# Patient Record
Sex: FEMALE | Race: WHITE
[De-identification: names, ages, dates, MRNs, and addresses within clinical notes are randomized per-mention and may not be internally consistent; named-entity substitution may affect disease eponyms.]

---

## 2018-06-27 ENCOUNTER — HOSPITAL ENCOUNTER (INPATIENT)
Dept: HOSPITAL 36 - ER | Age: 83
LOS: 9 days | Discharge: NURSING, REHAB OR LONG-TERM CARE | DRG: 871 | End: 2018-07-06
Attending: FAMILY MEDICINE | Admitting: FAMILY MEDICINE
Payer: MEDICARE

## 2018-06-27 DIAGNOSIS — K52.9: ICD-10-CM

## 2018-06-27 DIAGNOSIS — I25.119: ICD-10-CM

## 2018-06-27 DIAGNOSIS — E11.22: ICD-10-CM

## 2018-06-27 DIAGNOSIS — J44.0: ICD-10-CM

## 2018-06-27 DIAGNOSIS — E78.5: ICD-10-CM

## 2018-06-27 DIAGNOSIS — I21.4: ICD-10-CM

## 2018-06-27 DIAGNOSIS — E11.40: ICD-10-CM

## 2018-06-27 DIAGNOSIS — J44.1: ICD-10-CM

## 2018-06-27 DIAGNOSIS — I25.10: ICD-10-CM

## 2018-06-27 DIAGNOSIS — K44.9: ICD-10-CM

## 2018-06-27 DIAGNOSIS — M81.0: ICD-10-CM

## 2018-06-27 DIAGNOSIS — J18.9: ICD-10-CM

## 2018-06-27 DIAGNOSIS — E03.9: ICD-10-CM

## 2018-06-27 DIAGNOSIS — M35.00: ICD-10-CM

## 2018-06-27 DIAGNOSIS — Z66: ICD-10-CM

## 2018-06-27 DIAGNOSIS — L89.152: ICD-10-CM

## 2018-06-27 DIAGNOSIS — K80.10: ICD-10-CM

## 2018-06-27 DIAGNOSIS — Z88.1: ICD-10-CM

## 2018-06-27 DIAGNOSIS — Z87.11: ICD-10-CM

## 2018-06-27 DIAGNOSIS — I12.0: ICD-10-CM

## 2018-06-27 DIAGNOSIS — L30.9: ICD-10-CM

## 2018-06-27 DIAGNOSIS — N39.0: ICD-10-CM

## 2018-06-27 DIAGNOSIS — K55.9: ICD-10-CM

## 2018-06-27 DIAGNOSIS — A41.9: Primary | ICD-10-CM

## 2018-06-27 DIAGNOSIS — J96.00: ICD-10-CM

## 2018-06-27 DIAGNOSIS — Z88.0: ICD-10-CM

## 2018-06-27 DIAGNOSIS — F03.90: ICD-10-CM

## 2018-06-27 DIAGNOSIS — K57.33: ICD-10-CM

## 2018-06-27 DIAGNOSIS — K27.9: ICD-10-CM

## 2018-06-27 DIAGNOSIS — I25.9: ICD-10-CM

## 2018-06-27 DIAGNOSIS — Z83.3: ICD-10-CM

## 2018-06-27 DIAGNOSIS — Z82.49: ICD-10-CM

## 2018-06-27 DIAGNOSIS — Z88.8: ICD-10-CM

## 2018-06-27 DIAGNOSIS — K57.32: ICD-10-CM

## 2018-06-27 DIAGNOSIS — K21.9: ICD-10-CM

## 2018-06-27 DIAGNOSIS — N18.6: ICD-10-CM

## 2018-06-27 LAB
ALBUMIN SERPL-MCNC: 3.2 GM/DL (ref 3.7–5.3)
ALBUMIN/GLOB SERPL: 0.8 {RATIO} (ref 1–1.8)
ALP SERPL-CCNC: 107 U/L (ref 34–104)
ALT SERPL-CCNC: 5 U/L (ref 7–52)
AMYLASE SERPL-CCNC: 12 U/L (ref 29–103)
ANION GAP SERPL CALC-SCNC: 17 MMOL/L (ref 7–16)
ARTERIAL PATENCY WRIST A: (no result)
AST SERPL-CCNC: 20 U/L (ref 13–39)
BASOPHILS # BLD AUTO: 0 TH/CUMM (ref 0–0.2)
BASOPHILS NFR BLD AUTO: 0 % (ref 0–2)
BILIRUB SERPL-MCNC: 0.5 MG/DL (ref 0.3–1)
BUN SERPL-MCNC: 15 MG/DL (ref 7–25)
CALCIUM SERPL-MCNC: 9.6 MG/DL (ref 8.6–10.3)
CHLORIDE SERPL-SCNC: 99 MEQ/L (ref 98–107)
CHOLEST SERPL-MCNC: 130 MG/DL (ref ?–200)
CK SERPL-CCNC: 21 U/L (ref 30–223)
CO2 SERPL-SCNC: 23 MEQ/L (ref 21–31)
CREAT SERPL-MCNC: 1.1 MG/DL (ref 0.6–1.2)
EOSINOPHIL # BLD AUTO: 0 TH/CMM (ref 0.1–0.4)
EOSINOPHIL NFR BLD AUTO: 0.3 % (ref 0–5)
ERYTHROCYTE [DISTWIDTH] IN BLOOD BY AUTOMATED COUNT: 14.1 % (ref 11.5–20)
GLOBULIN SER-MCNC: 3.8 GM/DL
GLUCOSE SERPL-MCNC: 190 MG/DL (ref 70–105)
HBA1C MFR BLD: 5.1 % (ref 4–6)
HCT VFR BLD CALC: 46.5 % (ref 41–60)
HDLC SERPL-MCNC: 24 MG/DL (ref 23–92)
HGB BLD-MCNC: 15.1 GM/DL (ref 12–16)
HGB BLD-MCNC: 16 G/DL (ref 4–35)
INR PPP: 1.06 (ref 0.5–1.4)
LIPASE SERPL-CCNC: 6 U/L (ref 11–82)
LYMPHOCYTE AB SER FC-ACNC: 1.3 TH/CMM (ref 1.5–3)
LYMPHOCYTES NFR BLD AUTO: 15.2 % (ref 20–50)
MCH RBC QN AUTO: 30.3 PG (ref 27–31)
MCHC RBC AUTO-ENTMCNC: 32.6 PG (ref 28–36)
MCV RBC AUTO: 93.1 FL (ref 81–100)
MONOCYTES # BLD AUTO: 0.5 TH/CMM (ref 0.3–1)
MONOCYTES NFR BLD AUTO: 5.7 % (ref 2–10)
NEUTROPHILS # BLD: 7 TH/CMM (ref 1.8–8)
NEUTROPHILS NFR BLD AUTO: 78.8 % (ref 40–80)
PCO2 BLDA: 41 MMHG (ref 35–45)
PLATELET # BLD: 427 TH/CMM (ref 150–400)
PMV BLD AUTO: 6.8 FL
PO2 BLDA: 44 MMHG (ref 80–100)
POTASSIUM SERPL-SCNC: 4 MEQ/L (ref 3.5–5.1)
PROTHROMBIN TIME: 11 SECONDS (ref 9.5–11.5)
RBC # BLD AUTO: 5 MIL/CMM (ref 3.8–5.2)
SAO2 % BLDA: 79 % (ref 92–100)
SODIUM SERPL-SCNC: 135 MEQ/L (ref 136–145)
TRIGL SERPL-MCNC: 143 MG/DL (ref ?–150)
WBC # BLD AUTO: 8.8 TH/CMM (ref 4.8–10.8)

## 2018-06-27 PROCEDURE — C9113 INJ PANTOPRAZOLE SODIUM, VIA: HCPCS

## 2018-06-27 PROCEDURE — Z7610: HCPCS

## 2018-06-27 PROCEDURE — X6598: HCPCS

## 2018-06-27 PROCEDURE — X3401: HCPCS

## 2018-06-27 PROCEDURE — A9537 TC99M MEBROFENIN: HCPCS

## 2018-06-27 RX ADMIN — HYDROCODONE BITARTRATE AND ACETAMINOPHEN SCH TAB: 5; 325 TABLET ORAL at 23:52

## 2018-06-27 NOTE — ED PHYSICIAN CHART
ED Chief Complaint/HPI





- Patient Information


Date Seen:: 06/27/18


Time Seen:: 18:00


Chief Complaint:: Abdominal Pain


History of Present Illness:: 





onset x one day of diffuse, intermittent, crampy abdominal pain, N/V/D x 10; no 

report of trauma, H/As, S/T, neck pain, C/P, SOB, A/C, fever, chills, bleeding, 

or urinary s/s


Historian:: Patient, EMS


Review:: Nurse's Note Reviewed, Old Chart Reviewed, EMS run form Reviewed





ED Review of Systems





- Review of Systems


General/Constitutional: No fever, No chills, No weight loss, No weakness, No 

diaphoresis, No edema, No loss of appetite


Skin: No skin lesions, No rash, No bruising


Head: No headache, No light-headedness


Eyes: No loss of vision, No pain, No diplopia


ENT: No earache, No nasal drainage, No sore throat, No tinnitus


Neck: No neck pain, No swelling, No thyromegaly, No stiffness, No mass noted


Cardio Vascular: No chest pain, No palpitations, No PND, No orthopnea, No edema


Pulmonary: No SOB, No cough, No sputum, No wheezing


GI: Nausea, Vomiting, Diarrhea, Pain, No melena, No hematochezia, No 

constipation, No hematemesis


G/U: No dysuria, No frequency, No hematuria, No nacturia


Ob/Gyn: No vaginal discharge, No abnormal vaginal bleed, No contraction


Musculoskeletal: No bone or joint pain, No back pain, No muscle pain


Endocrine: No polyuria, No polydipsia


Psychiatric: No prior psych history, No depression, No anxiety, No suicidal 

ideation, No homicidal ideation, No auditory hallucination, No visual 

hallucination


Hematopoietic: No bruising, No lymphadenopathy


Allergic/Immuno: No urticaria, No angioedema


Neurological: No syncope, No focal symptoms, No weakness, No paresthesia, No 

headache, No seizure, No dizziness, Confusion, No vertigo





ED Past Medical History





- Past Medical History


Obtainable: Yes


Past Medical History: HTN, CAD, Asthma/COPD, PUD/GERD, ESRD, Dementia


Family History: Diabetes Melitus, HTN


Social History: Non Smoker, No Alcohol, No Drug Use, , Care Facility


Surgical History: None


Psychiatricy History: Dementia


Medication: Reviewed





Family Medical History





- Family Member


  ** Mother


History Unknown: Yes





ED Physical Exam





- Physical Examination


General/Constitutional: Awake, Well-developed, well-nourished, Alert, No 

distress, GCS 15, Non-toxic appearing, Ambulatory


Head: Atraumatic


Eyes: Lids, conjuctiva normal, PERRL, EOMI


Skin: Nl inspection, No rash, No skin lesions, No ecchymosis, Well hydrated, No 

lymphadenopathy


ENMT: External ears, nose nl, TM canals nl, Nasal exam nl, Lips, teeth, gums nl

, Oropharynx nl, Tonsils nl


Neck: Nontender, Full ROM w/o pain, No JVD, No nuchal rigidity, No bruit, No 

mass, No stridor


Respiratory: Nl effort/Exclusion


Other Respiratory comments:: 





Lungs: + Expiratory Wheezes


Cardio Vascular: RRR, No murmur, gallop, rubs, NL S1 S2, Carotid/Femoral/Distal 

pulses equal bilaterally


GI: No tenderness/rebounding/guarding, No organomegaly, No hernia, Normal BS's, 

Nondistended, No mass/bruits, No McBurney tenderness, Rectum exam nl


: No CVA tenderness


Extremities: No tenderness or effusion, Full ROM, normal strength in all 

extremities, No edema, Normal digits & nails


Neuro/Psych: Alert/oriented, DTR's symmetric, Normal sensory exam, Normal motor 

strength, Judgement/insight normal, Mood normal, Normal gait, No focal deficits


Misc: Normal back, No paraspinal tenderness





ED Labs/Radiology/EKG Results





- Lab Results


Comments:: 





Troponin: 0.20





- Radiology Results


Comments:: 





+ Infiltrate





- EKG Interpretations


EKG Time:: 18:24


Rate & Rhythm: 118; ST; PAT


Comments:: 





non-specific st-t changes; T- Wave Inversions





ED Septic Shock





- .


Is Septic Shock (SBP<90, OR Lactate>4 mmol\L) present?: No





ED Reassessment (Disposition)





- Reassessment


Reassessment Condition:: Improved





- Diagnosis


Diagnosis:: 





Abdominal Pain; N/V/D; AGE; GI Bleed; AGE; PNA; COPD with Exacerbation; Sepsis; 

Myocardial Ischemia





- Aftercare/Follow up Instructions


Aftercare/Follow-Up Instructions:: Counseled pt regarding lab results/diagnosis 

& need follow up, Counseled pt & family regarding lab results/diagnosis & need 

follow up





- Patient Disposition


Discharge/Transfer:: Acute Care w/in this hosp


Accepting Physician:: Dr. Germain


Time Called:: 2000


Time Responded:: 20:00


Admitted to:: Telemetry


Spoke to:: Dr. Germain


Admitting Medical Physician:: Dr. Germain


Condition at Disposition:: Stable, Improved

## 2018-06-28 LAB
APPEARANCE UR: (no result)
BACTERIA #/AREA URNS HPF: (no result) /HPF
BILIRUB UR-MCNC: NEGATIVE MG/DL
COLOR UR: YELLOW
EPI CELLS URNS QL MICRO: (no result) /LPF
GLUCOSE UR STRIP-MCNC: NEGATIVE MG/DL
KETONES UR STRIP-MCNC: 15 MG/DL
LEUKOCYTE ESTERASE UR-ACNC: (no result)
MICRO URNS: YES
NITRITE UR QL STRIP: POSITIVE
PH UR STRIP: 5.5 [PH] (ref 4.6–8)
PROT UR STRIP-MCNC: (no result) MG/DL
RBC # UR STRIP: NEGATIVE /UL
RBC #/AREA URNS HPF: (no result) /HPF (ref 0–5)
SP GR UR STRIP: 1.02 (ref 1–1.03)
URINALYSIS COMPLETE PNL UR: (no result)
UROBILINOGEN UR STRIP-ACNC: 1 E.U./DL (ref 0.2–1)
WBC #/AREA URNS HPF: (no result) /HPF (ref 0–5)

## 2018-06-28 RX ADMIN — DILTIAZEM HYDROCHLORIDE SCH: 30 TABLET, FILM COATED ORAL at 17:10

## 2018-06-28 RX ADMIN — MORPHINE SULFATE PRN MG: 2 INJECTION, SOLUTION INTRAMUSCULAR; INTRAVENOUS at 08:58

## 2018-06-28 RX ADMIN — HYDROCODONE BITARTRATE AND ACETAMINOPHEN SCH: 5; 325 TABLET ORAL at 21:00

## 2018-06-28 RX ADMIN — ALBUTEROL SULFATE PRN MG: 2.5 SOLUTION RESPIRATORY (INHALATION) at 07:14

## 2018-06-28 RX ADMIN — MORPHINE SULFATE PRN MG: 2 INJECTION, SOLUTION INTRAMUSCULAR; INTRAVENOUS at 17:54

## 2018-06-28 RX ADMIN — MORPHINE SULFATE PRN MG: 2 INJECTION, SOLUTION INTRAMUSCULAR; INTRAVENOUS at 01:36

## 2018-06-28 RX ADMIN — HYDROCODONE BITARTRATE AND ACETAMINOPHEN SCH: 5; 325 TABLET ORAL at 04:01

## 2018-06-28 RX ADMIN — DILTIAZEM HYDROCHLORIDE SCH: 30 TABLET, FILM COATED ORAL at 08:59

## 2018-06-28 RX ADMIN — HYDROCODONE BITARTRATE AND ACETAMINOPHEN SCH: 5; 325 TABLET ORAL at 09:02

## 2018-06-28 RX ADMIN — MORPHINE SULFATE PRN MG: 2 INJECTION, SOLUTION INTRAMUSCULAR; INTRAVENOUS at 23:43

## 2018-06-28 RX ADMIN — MORPHINE SULFATE PRN MG: 2 INJECTION, SOLUTION INTRAMUSCULAR; INTRAVENOUS at 04:51

## 2018-06-28 RX ADMIN — MORPHINE SULFATE PRN MG: 2 INJECTION, SOLUTION INTRAMUSCULAR; INTRAVENOUS at 15:24

## 2018-06-28 RX ADMIN — LEVOTHYROXINE SODIUM SCH: 100 TABLET ORAL at 06:57

## 2018-06-28 RX ADMIN — MORPHINE SULFATE PRN MG: 2 INJECTION, SOLUTION INTRAMUSCULAR; INTRAVENOUS at 20:08

## 2018-06-28 RX ADMIN — MORPHINE SULFATE PRN MG: 2 INJECTION, SOLUTION INTRAMUSCULAR; INTRAVENOUS at 12:53

## 2018-06-28 RX ADMIN — PANTOPRAZOLE SODIUM SCH: 40 TABLET, DELAYED RELEASE ORAL at 06:58

## 2018-06-28 RX ADMIN — LEVOFLOXACIN SCH: 5 INJECTION INTRAVENOUS at 02:11

## 2018-06-28 RX ADMIN — HYDROCODONE BITARTRATE AND ACETAMINOPHEN SCH: 5; 325 TABLET ORAL at 15:11

## 2018-06-28 NOTE — DIAGNOSTIC IMAGING REPORT
CHEST X-RAY: AP view



INDICATION: pain



COMPARISON: None



FINDINGS: Chronic lung changes are seen with increased left basal lung

markings.  No focal consolidation or effusions.  Mild cardiomegaly is

noted with atherosclerotic vascular disease.  Degenerative changes of

the spine are noted.



IMPRESSION:



Chronic lung changes with increased left basal lung markings probably

chronic.  Faint infiltrate is less likely.



Mild cardiomegaly with atherosclerosis.

## 2018-06-28 NOTE — HISTORY AND PHYSICAL
History of Present Illness





- HPI


Chief Complaint: 





abdominal pain 


HPI: 





87 year old female who is from a care facility admitted to telemetry due to 

nausea/vomiting/diarrhea and abdominal pain x 10 days, no fevers were reported 

at the care facility. 


Vital Signs: 





 Last Vital Signs











Temp  98.6 F   06/28/18 20:03


 


Pulse  93   06/28/18 20:03


 


Resp  19   06/28/18 20:03


 


BP  141/80   06/28/18 20:03


 


Pulse Ox  94   06/28/18 20:03














Past Medical History


Other History: 





cad


copd


pud


gerd


dementia


asthma


hypotension








Family Medical History





- Family Member


  ** Mother


History Unknown: Yes


Other Medical History: Unable to remember, can't give information at this time.





Social History


Smoke: No


Alcohol: None


Drugs: None


Lives: Nursing Home





- Medications


Home Medications: 


Home Medication











 Medication  Instructions  Recorded  Type


 


Acetaminophen [Pain Reliever] 650 mg PO Q6H PRN 06/27/18 History


 


Albuterol Sulfate 1 unit INH Q2H PRN 06/27/18 History


 


Albuterol Sulfate 1 unit INH Q4H 06/27/18 History


 


Ascorbic Acid 1 tab PO DAILY 06/27/18 History


 


Clopidogrel [Plavix] 1 tab PO DAILY 06/27/18 History


 


Cranberry Fruit Extract [Cranberry] 1 tab PO DAILY 06/27/18 History


 


Diltiazem [Cardizem*] 1 tab PO BID 06/27/18 History


 


Docusate Sodium [Colace] 1 tab PO BID 06/27/18 History


 


Donepezil Hcl [Aricept] 1 tbs PO HS 06/27/18 History


 


Gabapentin [Neurontin] 1 tab PO Q6H 06/27/18 History


 


Hydrocodone/Acetaminophen [Norco 1 tab PO Q6H 06/27/18 History





325 mg-5 mg*]   


 


Levothyroxine Sodium [Synthroid] 1 tab PO DAILY 06/27/18 History


 


Montelukast [Singulair] 1 tab PO DAILY 06/27/18 History


 


Pantoprazole Sodium [Protonix] 40 mg PO DAILY 06/27/18 History


 


Prednisone [Bipin] 1 tab PO DAILY 06/27/18 History














- Allergies


Allergies/Adverse Reactions: 


 Allergies











Allergy/AdvReac Type Severity Reaction Status Date / Time


 


amoxicillin Allergy   Verified 06/27/18 18:23


 


clindamycin Allergy   Verified 06/27/18 18:24


 


naproxen Allergy   Verified 06/27/18 18:24


 


Penicillins [PCN] Allergy   Verified 06/27/18 18:25


 


raisin Allergy   Uncoded 06/27/18 18:27


 


teflaro Allergy   Uncoded 06/27/18 18:26


 


yogurt Allergy   Uncoded 06/27/18 18:26














Review of Systems





- Review of Systems


Constitutional: Report: Weakness


Eyes: Report: No Significant


Respiratory: Report: No Significant


Cardiovascular: Report: No Significant


Neurological: Report: Weakness





Physical Exam





- Physical Exam


HEENT: Report: Ears Nose Throat within normal limits


Neck: Report: Within normal limits


Cardiovascular Systems: Report: +s1/s2 noted, Regular, Rate and Rhythm


Respiratory: Report: Breath Sounds are within normal limits


Extremities: Report: Non-tender to palpation.


Skin: Report: Color of skin is within normal limits


Neuro/Psych: Report: Mood affect is within normal limits





- Lab Results


All Lab Results last 24 hours: 





 Laboratory Results - last 24 hr











  06/28/18





  05:05


 


Urine Source  CATH


 


Urine Color  YELLOW


 


Urine Clarity  TURBID H


 


Urine pH  5.5


 


Ur Specific Gravity  1.020


 


Urine Protein  TRACE


 


Urine Glucose (UA)  NEGATIVE


 


Urine Ketones  15 H


 


Urine Blood  NEGATIVE


 


Urine Nitrate  POSITIVE H


 


Urine Bilirubin  NEGATIVE


 


Urine Urobilinogen  1.0


 


Ur Leukocyte Esterase  MODERATE H


 


Urine RBC  0-2


 


Urine WBC   H


 


Ur Epithelial Cells  MODERATE


 


Urine Bacteria  4+ H














- Assessment


Assessment: 





abdominal pain


gastroenteritis


cad


copd 


pud


gerd


dementia








- Plan


Plan: 





iv vanco as per id


cbc/bmp in am


continue the rest of the orders

## 2018-06-28 NOTE — CONSULTATION
DATE OF CONSULTATION:  06/27/2018



INFECTIOUS DISEASE CONSULTATION



REFERRING PHYSICIAN:  Dr. Germain.



REASON FOR CONSULTATION:  Abdominal pain.



HISTORY OF PRESENT ILLNESS:  The patient is an 87-year-old female with a past

medical history of hypertension, coronary artery disease, asthma, COPD, peptic

ulcer, GERD, brought from the nursing facility for diffuse, crampy abdominal

pain associated with nausea, vomiting and diarrhea for the last 10 days.  The

patient has no fever, no chills.  On initial evaluation, the patient's

temperature was 98.9 degrees Fahrenheit and WBC count was 8800.  Zosyn and

vancomycin were started and ID consult was called for further antibiotic

management.



PAST MEDICAL HISTORY:  Includes coronary artery disease, hypertension, asthma,

COPD, peptic ulcer, GERD and dementia.



FAMILY HISTORY:  Diabetes mellitus type 2, hypertension.



SOCIAL HISTORY:  The patient lives at a nursing facility.  No history of

smoking, alcohol or drug use.  She is .



PAST SURGICAL HISTORY:  None.



PSYCHIATRIC HISTORY:  Dementia.



REVIEW OF SYSTEMS:

GENERAL:  The patient has no fever, no chills, no generalized weakness, no loss

of appetite.

HEENT:  No diplopia, no photophobia, no sore throat.

RESPIRATORY:  No cough, no short of breath.

CARDIOVASCULAR:  No chest pain or palpitations.

GASTROINTESTINAL:  The patient has diffuse, crampy abdominal pain.  The patient

has nausea and vomiting with diarrhea.  No constipation.

GENITOURINARY:  No dysuria.

NEUROLOGIC:  No headache, no dizziness.  No focal weakness.



PHYSICAL EXAMINATION:

VITAL SIGNS:  Current vital signs show temperature is 99.8 degrees Fahrenheit,

pulse 102, respirations 18, blood pressure 102/56.

GENERAL:  The patient is comfortable, lying in the bed, not in acute distress,

lean and thin female.

HEENT:  Head is normocephalic, atraumatic.  Oral cavity moist, pink tongue. 

Eyes:  No pallor, no icterus, PERRLA, EOMI.

NECK:  Supple.  No JVD, no carotid bruit.  Trachea in midline.

CHEST:  Bilateral breath sounds.  No crackles or wheezing.

HEART:  S1, S2 within normal limits.  Regular rhythm.  No murmur, no gallop.

ABDOMEN:  The patient is nondistended, but the patient has generalized

tenderness.  Bowel sounds present.

EXTREMITIES:  No cyanosis, no clubbing, no edema.

NEUROLOGICAL:  Alert, awake, oriented x 3.  Communicates well.  Speech is clear.



LABORATORY DATA:  Current labs show WBC count 8800, hemoglobin 15.1, hematocrit

46.5, platelets are 427,000, neutrophils 78.8%.  Sodium is 135, potassium 4,

chloride 99, bicarbonate is 23, BUN is 17, creatinine 1.1, and glucose is 190. 

LFTs reviewed.  CPK is 21.  Troponin 0.2 and .  Chest x-ray is pending.



IMPRESSION:

1.  Colicky abdominal pain, most likely the patient had gastroenteritis, rule

out other etiologies.

2.  Coronary artery disease.

3.  Chronic obstructive pulmonary disease.

4.  Peptic ulcer disease.



RECOMMENDATIONS:  We will continue Zosyn and discontinue vancomycin.  We will do

a CT scan of the abdomen and pelvis without contrast.



Thank you, Dr. Germain, for involving me in taking care of this patient.





DD: 06/28/2018 00:42

DT: 06/28/2018 02:38

JOB# [id number]  [id number]

## 2018-06-28 NOTE — CONSULTATION
DATE OF CONSULTATION:  06/28/2018



REQUESTING PHYSICIAN:  Shanta Germain.



REASON FOR CONSULTATION:  Abdominal pain.



HISTORY OF PRESENT ILLNESS:  An 87-year-old female with dementia, diabetes

mellitus, hypertension, asthma, COPD, peptic ulcer disease, GERD, admitted for a

few day history of vague abdominal pain with nausea, vomiting and diarrhea. 

Here, she was noted to have a normal white blood cell count and hemoglobin, but

CT showed diverticulitis.  The patient is a poor historian.  It is unclear

whether she has had diverticulitis previously.  She is also unaware of any

previous surgeries or past colonoscopies.  She is, however, a poor historian.



PAST MEDICAL HISTORY:  As above.



MEDICATIONS:  Here are Tylenol, Norco, albuterol, vitamin C, Plavix, diltiazem,

docusate, Aricept, Neurontin, Levaquin, Synthroid, Singulair, morphine, Zofran,

Protonix, Zosyn, prednisone 5 mg daily and IV fluids.



ALLERGIES:  AMOXICILLIN, CLINDAMYCIN, NAPROSYN, PENICILLIN, RAISINS, TEFLARO,

and YOGURT.



SOCIAL HISTORY:  No recent tobacco, alcohol, or drugs.



FAMILY HISTORY:  Noncontributory.



REVIEW OF SYSTEMS:  A comprehensive 12-point review of systems conducted and is

only positive for the signs and symptoms present in the history of present

illness.



PHYSICAL EXAMINATION:

VITAL SIGNS:  Temperature of 98.0, blood pressure is 114/58, pulse is 92,

respirations 76, O2 sat is 94%.

GENERAL:  The patient is well-developed, well-nourished female in no acute

distress.

HEENT:  Sclerae nonicteric.  Oropharynx clear.

CARDIOVASCULAR:  Regular rate and rhythm.

LUNGS:  Clear to auscultation bilaterally.

ABDOMEN:  Soft, mild left lower and right lower quadrant tenderness to palpation

with mild distention, no rebound or guarding is appreciated.

EXTREMITIES:  No clubbing, cyanosis or edema.

RECTAL:  Deferred.



LABORATORY DATA AND IMAGING:  Complete blood count is normal except for platelet

count mildly elevated to 427.  INR is normal.  Creatinine is normal.  Liver

enzymes are normal.  Urinalysis shows positive nitrites and leukocyte esterase,

 wbc's and 4+ bacteria.



IMPRESSION:

1.  Abdominal pain secondary to acute sigmoid diverticulitis with CT positive. 

There is also question of air around the bladder.  This could be from a

colovesical fistula versus focal abscess.  The patient also likely has a UTI

based on urinalysis.

2.  Elevated troponin, rule out myocardial infarction.  The patient does have a

history of coronary artery disease.

3.  Gallstones, likely asymptomatic.

4.  Hiatal hernia per CT.

5.  History of diabetes mellitus, hypertension, asthma, COPD, peptic ulcer

disease, GERD and dementia.



RECOMMENDATIONS:

1.  N.p.o. with IV fluids and pain control measures.

2.  Antibiotics.

3.  Monitor labs.

4.  Conservative non-endoscopic management for now.



Thank you, Dr. Shanta Germain for involving us in the care of your patient. 

If you have any further questions, please call us.





DD: 06/28/2018 17:17

DT: 06/28/2018 23:41

JOB# [id number]  [id number]

## 2018-06-28 NOTE — DIAGNOSTIC IMAGING REPORT
CT abdomen and pelvis without intravenous contrast



Indication: Abdominal pain



Comparison: None,



Technique: Axial images were obtained from the lung bases to the

bilateral proximal femurs without IV contrast.  Coronal reconstructions

were made.  total DLP: [id number],  [id number]



FINDINGS:



Hypoventilatory atelectatic changes of the lung bases are noted. 

Bibasal consolidative changes are noted.  Assessment of solid organs is

limited that lack of IV contrast.  Cardiomegaly is noted.  There is a

moderate to large sized hiatal hernia.  No focal hepatic lesions. 

Distended gallbladder is noted with gallstones.  No focal splenic

lesions.  Splenic artery calcifications are noted with calcification of

the splenic hilum also noted.  Severe pancreatic atrophy is noted with

no discrete focal lesions.  No focal pancreatic lesions.  No evidence of

hydronephrosis or nephrolithiasis.  



Diverticulosis is noted with diffuse inflammatory changes and bowel wall

thickening involving the descending and sigmoid colon with small

surrounding free fluid suggestive of diverticulitis and possible

colitis.  Inflammatory changes extend to the pelvis.  The urinary

bladder wall thickening is also noted.  There is suggestion of the Tiny

pocket of gas is seen along left side of the urinary bladder.  There

appear to be fibroid calcification of the uterus measuring 1.8 cm. 

There is a cystic right adnexal mass is in 5.0 x 4.5 cm.



No evidence of free abdominal air or abscess.  Diffuse atherosclerotic

vascular disease is noted.  An IVC filter is seen at the L2/L3 level. 

Extensive multilevel degenerative changes of spine are noted advanced

compression fractures of T11 and L3.  There is also evidence of previous

posterior fusion of L2 and L4.  Osteopenia is noted.



IMPRESSION:



Diverticulosis with bowel wall thickening and inflammatory change

extending from the descending colon to the sigmoid colon with small

amount of adjacent free fluid.  Findings suggest diverticulitis.  There

may be colitis within the descending colon.  Clinical correlation follow

up recommended.  No evidence of free air or abscess formation.



Diverticular inflammatory changes extend to the pelvis.  Urinary bladder

wall thickening is also noted.  There is suggestion of a tiny pocket of

gas along the superior left side of the urinary bladder.  Recent

instrumentation or infectious/inflammatory process should be considered.

 Occult fistulous connection from the sigmoid colon to the urinary

bladder is less likely but cannot be excluded.



Right adnexal 5.0 x 4.5 cm cystic lesion.  Recommend further assessment

with ultrasound.



Calcified uterine fibroid.



Distended gallbladder with gallstone.  Recommend further assessment with

ultrasound.  



Moderate to large size hiatal hernia



Bibasilar passive atelectatic and consolidative changes.



IVC filter at the L2/L3 level.



Advanced compression fractures of T11 and L3.  There is also evidence of

posterior fusion of L2 and L4.



Osteopenia.  



Degenerative changes.  



Atherosclerotic vascular disease.

## 2018-06-29 RX ADMIN — PANTOPRAZOLE SODIUM SCH: 40 TABLET, DELAYED RELEASE ORAL at 08:30

## 2018-06-29 RX ADMIN — MORPHINE SULFATE PRN MG: 2 INJECTION, SOLUTION INTRAMUSCULAR; INTRAVENOUS at 14:02

## 2018-06-29 RX ADMIN — HYDROMORPHONE HYDROCHLORIDE PRN MG: 1 INJECTION, SOLUTION INTRAMUSCULAR; INTRAVENOUS; SUBCUTANEOUS at 17:05

## 2018-06-29 RX ADMIN — LEVOFLOXACIN SCH MLS/HR: 5 INJECTION INTRAVENOUS at 01:00

## 2018-06-29 RX ADMIN — HYDROMORPHONE HYDROCHLORIDE PRN MG: 2 INJECTION INTRAMUSCULAR; INTRAVENOUS; SUBCUTANEOUS at 22:50

## 2018-06-29 RX ADMIN — DILTIAZEM HYDROCHLORIDE SCH: 30 TABLET, FILM COATED ORAL at 08:30

## 2018-06-29 RX ADMIN — HYDROCODONE BITARTRATE AND ACETAMINOPHEN SCH: 5; 325 TABLET ORAL at 17:08

## 2018-06-29 RX ADMIN — HYDROCODONE BITARTRATE AND ACETAMINOPHEN SCH: 5; 325 TABLET ORAL at 21:46

## 2018-06-29 RX ADMIN — MORPHINE SULFATE PRN MG: 2 INJECTION, SOLUTION INTRAMUSCULAR; INTRAVENOUS at 08:10

## 2018-06-29 RX ADMIN — MORPHINE SULFATE PRN MG: 2 INJECTION, SOLUTION INTRAMUSCULAR; INTRAVENOUS at 06:29

## 2018-06-29 RX ADMIN — LEVOTHYROXINE SODIUM SCH: 100 TABLET ORAL at 08:30

## 2018-06-29 RX ADMIN — MORPHINE SULFATE PRN MG: 2 INJECTION, SOLUTION INTRAMUSCULAR; INTRAVENOUS at 03:43

## 2018-06-29 RX ADMIN — DILTIAZEM HYDROCHLORIDE SCH: 30 TABLET, FILM COATED ORAL at 17:08

## 2018-06-29 RX ADMIN — HYDROCODONE BITARTRATE AND ACETAMINOPHEN SCH: 5; 325 TABLET ORAL at 09:50

## 2018-06-29 RX ADMIN — HYDROCODONE BITARTRATE AND ACETAMINOPHEN SCH: 5; 325 TABLET ORAL at 05:36

## 2018-06-29 RX ADMIN — MORPHINE SULFATE PRN MG: 2 INJECTION, SOLUTION INTRAMUSCULAR; INTRAVENOUS at 01:12

## 2018-06-29 RX ADMIN — MORPHINE SULFATE PRN MG: 2 INJECTION, SOLUTION INTRAMUSCULAR; INTRAVENOUS at 09:38

## 2018-06-29 NOTE — GENERAL PROGRESS NOTE
Subjective





- Review of Systems


Service Date: 18





Objective





- Results


Result Diagrams: 


 18 18:40





 18 18:40


Recent Labs: 


 Laboratory Last Values











WBC  8.8 Th/cmm (4.8-10.8)   18  18:40    


 


RBC  5.00 Mil/cmm (3.80-5.20)   18  18:40    


 


Hgb  15.1 gm/dL (12-16)   18  18:40    


 


Hct  46.5 % (41.0-60)   18  18:40    


 


MCV  93.1 fl ()   18  18:40    


 


MCH  30.3 pg (27.0-31.0)   18  18:40    


 


MCHC Differential  32.6 pg (28.0-36.0)   18  18:40    


 


RDW  14.1 % (11.5-20.0)   18  18:40    


 


Plt Count  427 Th/cmm (150-400)  H  18  18:40    


 


MPV  6.8 fl  18  18:40    


 


Neutrophils %  78.8 % (40.0-80.0)   18  18:40    


 


Lymphocytes %  15.2 % (20.0-50.0)  L  18  18:40    


 


Monocytes %  5.7 % (2.0-10.0)   18  18:40    


 


Eosinophils %  0.3 % (0.0-5.0)   18  18:40    


 


Basophils %  0.0 % (0.0-2.0)   18  18:40    


 


PT  11.0 SECONDS (9.5-11.5)   18  18:40    


 


INR  1.06  (0.5-1.4)   18  18:40    


 


Specimen Source  arterial   18  18:58    


 


Sample Site  rr   18  18:58    


 


pH  7.38  (7.35-7.45)   18  18:58    


 


pCO2  41.0 mmHg (35.0-45.0)   18  18:58    


 


pO2  44.0 mmHg (80.0-100.0)  L*  18  18:58    


 


HCO3  23.9 mEq/L (20.0-26.0)   18  18:58    


 


Base Excess  -0.8 mEq/L (-3.0-3.0)   18  18:58    


 


O2 Saturation  79.0 % (92.0-100.0)  L  18  18:58    


 


Magdaleno Test  y   18  18:58    


 


Vent Rate  N/A   18  18:58    


 


Inspired O2  36   18  18:58    


 


Tidal Volume  N/A   18  18:58    


 


PEEP  N/A   18  18:58    


 


Pressure (ins/psv/peep)  N/A   18  18:58    


 


Critical Value  MM/JY   18  18:58    


 


Sodium  135 mEq/L (136-145)  L  18  18:40    


 


Potassium  4.0 mEq/L (3.5-5.1)   18  18:40    


 


Chloride  99 mEq/L ()   18  18:40    


 


Carbon Dioxide  23.0 mEq/L (21.0-31.0)   18  18:40    


 


Anion Gap  17.0  (7.0-16.0)  H  18  18:40    


 


BUN  15 mg/dL (7-25)   18  18:40    


 


Creatinine  1.1 mg/dL (0.6-1.2)   18  18:40    


 


Est GFR ( Amer)  TNP   18  18:40    


 


Est GFR (Non-Af Amer)  TNP   18  18:40    


 


BUN/Creatinine Ratio  13.6   18  18:40    


 


Glucose  190 mg/dL ()  H  18  18:40    


 


Hemoglobin A1c %  5.1 % (4.0-6.0)   18  18:40    


 


Calcium  9.6 mg/dL (8.6-10.3)   18  18:40    


 


Total Bilirubin  0.5 mg/dL (0.3-1.0)   18  18:40    


 


AST  20 U/L (13-39)   18  18:40    


 


ALT  5 U/L (7-52)  L  18  18:40    


 


Alkaline Phosphatase  107 U/L ()  H  18  18:40    


 


Creatine Kinase  21 U/L ()  L  18  18:40    


 


Troponin I  0.20 ng/mL (0.01-0.05)  H*  18  18:40    


 


B-Natriuretic Peptide  163.0 pg/mL (5.0-100.0)  H  18  18:40    


 


Total Protein  7.0 gm/dL (6.0-8.3)   18  18:40    


 


Albumin  3.2 gm/dL (3.7-5.3)  L  18  18:40    


 


Globulin  3.8 gm/dL  18  18:40    


 


Albumin/Globulin Ratio  0.8  (1.0-1.8)  L  18  18:40    


 


Triglycerides  143 mg/dL (<150)   18  18:40    


 


Cholesterol  130 mg/dL (<200)   18  18:40    


 


LDL Cholesterol Direct  93 mg/dL ()   18  18:40    


 


HDL Cholesterol  24 mg/dL (23-92)   18  18:40    


 


Amylase  12 U/L ()  L  18  18:40    


 


Lipase  6 U/L (11-82)  L  18  18:40    


 


Urine Source  CATH   18  05:05    


 


Urine Color  YELLOW   18  05:05    


 


Urine Clarity  TURBID  (CLEAR)  H  18  05:05    


 


Urine pH  5.5  (4.6 - 8.0)   18  05:05    


 


Ur Specific Gravity  1.020  (1.005-1.030)   18  05:05    


 


Urine Protein  TRACE mg/dL (NEGATIVE)   18  05:05    


 


Urine Glucose (UA)  NEGATIVE mg/dL (NEGATIVE)   18  05:05    


 


Urine Ketones  15 mg/dL (NEGATIVE)  H  18  05:05    


 


Urine Blood  NEGATIVE  (NEGATIVE)   18  05:05    


 


Urine Nitrate  POSITIVE  (NEGATIVE)  H  18  05:05    


 


Urine Bilirubin  NEGATIVE  (NEGATIVE)   18  05:05    


 


Urine Urobilinogen  1.0 E.U./dL (0.2 - 1.0)   18  05:05    


 


Ur Leukocyte Esterase  MODERATE  (NEGATIVE)  H  18  05:05    


 


Urine RBC  0-2 /hpf (0-5)   18  05:05    


 


Urine WBC   /hpf (0-5)  H  18  05:05    


 


Ur Epithelial Cells  MODERATE /lpf (FEW)   18  05:05    


 


Urine Bacteria  4+ /hpf (NONE SEEN)  H  18  05:05    














- Physical Exam


Vitals and I&O: 


 Vital Signs











Temp  98.1 F   18 20:23


 


Pulse  136   18 20:23


 


Resp  20   18 20:23


 


BP  119/68   18 20:23


 


Pulse Ox  96   18 20:23








 Intake & Output











 18





 06:59 18:59 06:59


 


Intake Total 1000 1050 0


 


Balance 1000 1050 0


 


Weight (lbs) 75.296 kg 75.296 kg 74.843 kg


 


Intake:   


 


  Intake, IV Amount 1000 1000 


 


    Sodium Chloride 0.9% 1, 1000 1000 





    000 ml @ 125 mls/hr IV .   





    Q8H [facility] Rx#:[id number]   


 


  Oral  50 0


 


Other:   


 


  # Voids 2 2 2


 


  # Bowel Movements 0 0 1


 


  Stool Characteristics  Formed 


 


  Weight Source Bedscale Bedscale Bedscale











Active Medications: 


Current Medications





Acetaminophen (Tylenol)  650 mg PO Q6H PRN


   PRN Reason: Pain (Mild)


   Stop: 18 02:01


Acetaminophen/Hydrocodone Bitart (Norco 5mg/325mg)  1 tab PO Q6H [facility]


   Stop: 18 21:29


   Last Admin: 18 17:08 Dose:  Not Given


Albuterol Sulfate (Albuterol 2.5mg/3ml Neb Ud)  1.25 mg HHN Q2HRT PRN


   PRN Reason: Shortness of Breath


   Stop: 18 02:12


   Last Admin: 18 07:14 Dose:  1.25 mg


Ascorbic Acid (Vitamin C)  500 mg PO DAILY [facility]


   Stop: 18 08:59


   Last Admin: 18 08:30 Dose:  Not Given


Carvedilol (Coreg)  6.25 mg PO BID [facility]


   Stop: 18 16:59


   Last Admin: 18 17:08 Dose:  Not Given


Clopidogrel Bisulfate (Plavix)  75 mg PO DAILY [facility]


   Stop: 18 08:59


   Last Admin: 18 08:30 Dose:  Not Given


Diltiazem HCl (Cardizem)  60 mg PO Q6HR [facility]


   Stop: 18 17:59


   Last Admin: 18 17:08 Dose:  Not Given


Docusate Sodium (Colace)  100 mg PO BID [facility]


   Stop: 18 08:59


   Last Admin: 18 17:08 Dose:  Not Given


Donepezil HCl (Aricept)  10 mg PO HS [facility]


   Stop: 18 21:59


   Last Admin: 18 20:12 Dose:  Not Given


Gabapentin (Neurontin)  600 mg PO Q6HR [facility]


   Stop: 18 05:59


   Last Admin: 18 17:09 Dose:  Not Given


Hydromorphone HCl (Dilaudid)  1 mg IVP Q6H PRN


   PRN Reason: Severe Pain


   Stop: 18 16:38


   Last Admin: 18 17:05 Dose:  1 mg


Sodium Chloride (Nacl 0.9%)  1,000 mls @ 125 mls/hr IV .Q8H [facility]


   Stop: 18 23:44


   Last Admin: 18 14:03 Dose:  125 mls/hr


Levofloxacin (Levaquin Pb)  250 mg in 50 mls @ 50 mls/hr IV Q24HR [facility]


   Stop: 18 00:59


   Last Admin: 18 01:00 Dose:  50 mls/hr


Levothyroxine Sodium (Synthroid)  0.025 mg PO DAILY@0730 [facility]


   Stop: 18 07:29


   Last Admin: 18 08:30 Dose:  Not Given


Metronidazole (Flagyl)  500 mg PO BID [facility]


   Stop: 18 09:01


   Last Admin: 18 17:08 Dose:  Not Given


Montelukast Sodium (Singulair)  10 mg PO DAILY [facility]


   Stop: 18 08:59


   Last Admin: 18 08:31 Dose:  Not Given


Morphine Sulfate (Morphine)  1 mg IVP Q4HR PRN


   PRN Reason: Moderate Pain


   Stop: 18 21:35


   Last Admin: 18 14:02 Dose:  1 mg


Mupirocin (Bactroban Oint)  1 appl NS BID [facility]


   Stop: 18 09:01


   Last Admin: 18 16:27 Dose:  1 appl


Ondansetron HCl (Zofran)  4 mg IV Q6H PRN


   PRN Reason: Nausea / Vomiting


   Stop: 18 21:34


   Last Admin: 18 16:26 Dose:  4 mg


Pantoprazole Sodium (Protonix)  40 mg PO QDAC [facility]


   Stop: 18 07:29


   Last Admin: 18 08:30 Dose:  Not Given


Prednisone (Deltasone)  5 mg PO DAILY [facility]


   Stop: 18 08:59


   Last Admin: 18 08:31 Dose:  Not Given








General: No acute distress, Mild distress


Neck: Supple


Cardiovascular: Regular rate


Lungs: Clear to auscultation


Abdomen: Bowel sounds, Soft, Tender (DIFFUSE), Distended (MILD)





Assessment/Plan





- Assessment


Assessment: 





abdominal pain


gastroenteritis


cad


copd 


pud


gerd


dementia








- Plan


Plan: 





iv vanco as per id


cbc/bmp in am


continue the rest of the orders 





Nutritional Asmnt/Malnutr-PDOC





- Dietary Evaluation


Malnutrition Findings (Please click <Entered> for more info): 








Nutritional Asmnt/Malnutrition                             Start:  18 14:

47


Text:                                                      Status: Complete    

  


Freq:                                                                          

  


Protocol:                                                                      

  


 Document     18 14:47  LCHENG  (Rec: 18 15:32  [id number]  [id number])


 Nutritional Asmnt/Malnutrition


     Patient General Information


      Nutritional Screening                      High Risk


      Diagnosis                                  elevated troponin, hypoxia,


                                                 possible PNA


      Pertinent Medical Hx/Surgical Hx           HTN, CAD, asthma/COPD, PUD/


                                                 GERD, ESRD, dementia


      Subjective Information                     Pt seen on mask at time of


                                                 visit. Pt was NPO for CT


                                                 scheduled today, no PO intake


                                                 information available.


      Current Diet Order/ Nutrition Support      NPO


      Pertinent Medications                      vit C, colace, levaquin,


                                                 synthorid, zofran, nacl 0.9%,


                                                 piperacillin, protonix


      Pertinent Labs                              Na 135, Glucose 190, A1c


                                                 5.1


     Nutritional Hx/Data


      Height                                     1.63 m


      Height (Calculated Centimeters)            162.6


      Current Weight (lbs)                       73.936 kg


      Weight (Calculated Kilograms)              73.9


      Weight (Calculated Grams)                  27128.6


      Ideal Body Weight                          120


      Body Mass Index (BMI)                      27.9


      Weight Status                              Overweight


     GI Symptoms


      GI Symptoms                                None


      Last BM                                    none


      Difficult in:                              None


      Skin Integrity/Comment:                    reddened to coccyx, buttocks


     Estimated Nutritional Goals


      BEE in Kcals:                              Using Current wt


      Calories/Kcals/Kg                          23-27


      Kcals Calculated                           2878-2140


      Protein g/k


      Protein Calculated                         74


      Fluid: ml                                  1702-1998ml (1ml/kcal)


     Nutritional Problem


      1. Problem


       Problem                                   altered nutrition related labs


       Etiology                                  hyperglycemia


       Signs/Symptoms:                           glucose 190 at admission


     Malnutrition Alert


      Is there a minimum of two criteria         No


       selected?                                 


       Query Text:Check all the applicable       


       criteria. A minimum of two criteria are   


       recommended for diagnosis of either       


       severe or non-severe malnutrition.        


     Malnutrition Related to Morbid Obesity


      Malnutrition related to morbid obesity     No


     Intervention/Recommendation


      Comments                                   1. Resume pureed JOJO diet


                                                 after CT exam. Assist pt with


                                                 meals. If glucose continue


                                                 high, will consider adding


                                                 CCHO diet.


                                                 2. Monitor PO intake, wt, labs


                                                 and skin integrity


                                                 3. F/U as high risk in 2-3


                                                 days, -


     Expected Outcomes/Goals


      Expected Outcomes/Goals                    1. PO intake to meet at least


                                                 75% of nutritional needs.


                                                 2. Wt stability, skin to


                                                 remain intact, labs to


                                                 approach WNL.

## 2018-06-29 NOTE — GI PROGRESS NOTE
Subjective





- Review of Systems


Service Date: 06/29/18


Subjective: 





GI NOTE





CONFUSED


C/O R SIDED CHEST PAIN AND ABD PAIN.


NPO.





Objective





- Results


Result Diagrams: 


 06/27/18 18:40





 06/27/18 18:40


Recent Labs: 


 Laboratory Last Values











WBC  8.8 Th/cmm (4.8-10.8)   06/27/18  18:40    


 


RBC  5.00 Mil/cmm (3.80-5.20)   06/27/18  18:40    


 


Hgb  15.1 gm/dL (12-16)   06/27/18  18:40    


 


Hct  46.5 % (41.0-60)   06/27/18  18:40    


 


MCV  93.1 fl ()   06/27/18  18:40    


 


MCH  30.3 pg (27.0-31.0)   06/27/18  18:40    


 


MCHC Differential  32.6 pg (28.0-36.0)   06/27/18  18:40    


 


RDW  14.1 % (11.5-20.0)   06/27/18  18:40    


 


Plt Count  427 Th/cmm (150-400)  H  06/27/18  18:40    


 


MPV  6.8 fl  06/27/18  18:40    


 


Neutrophils %  78.8 % (40.0-80.0)   06/27/18  18:40    


 


Lymphocytes %  15.2 % (20.0-50.0)  L  06/27/18  18:40    


 


Monocytes %  5.7 % (2.0-10.0)   06/27/18  18:40    


 


Eosinophils %  0.3 % (0.0-5.0)   06/27/18  18:40    


 


Basophils %  0.0 % (0.0-2.0)   06/27/18  18:40    


 


PT  11.0 SECONDS (9.5-11.5)   06/27/18  18:40    


 


INR  1.06  (0.5-1.4)   06/27/18  18:40    


 


Specimen Source  arterial   06/27/18  18:58    


 


Sample Site  rr   06/27/18  18:58    


 


pH  7.38  (7.35-7.45)   06/27/18  18:58    


 


pCO2  41.0 mmHg (35.0-45.0)   06/27/18  18:58    


 


pO2  44.0 mmHg (80.0-100.0)  L*  06/27/18  18:58    


 


HCO3  23.9 mEq/L (20.0-26.0)   06/27/18  18:58    


 


Base Excess  -0.8 mEq/L (-3.0-3.0)   06/27/18  18:58    


 


O2 Saturation  79.0 % (92.0-100.0)  L  06/27/18  18:58    


 


Magadleno Test  y   06/27/18  18:58    


 


Vent Rate  N/A   06/27/18  18:58    


 


Inspired O2  36   06/27/18  18:58    


 


Tidal Volume  N/A   06/27/18  18:58    


 


PEEP  N/A   06/27/18  18:58    


 


Pressure (ins/psv/peep)  N/A   06/27/18  18:58    


 


Critical Value  MM/JY   06/27/18  18:58    


 


Sodium  135 mEq/L (136-145)  L  06/27/18  18:40    


 


Potassium  4.0 mEq/L (3.5-5.1)   06/27/18  18:40    


 


Chloride  99 mEq/L ()   06/27/18  18:40    


 


Carbon Dioxide  23.0 mEq/L (21.0-31.0)   06/27/18  18:40    


 


Anion Gap  17.0  (7.0-16.0)  H  06/27/18  18:40    


 


BUN  15 mg/dL (7-25)   06/27/18  18:40    


 


Creatinine  1.1 mg/dL (0.6-1.2)   06/27/18  18:40    


 


Est GFR ( Amer)  TNP   06/27/18  18:40    


 


Est GFR (Non-Af Amer)  TNP   06/27/18  18:40    


 


BUN/Creatinine Ratio  13.6   06/27/18  18:40    


 


Glucose  190 mg/dL ()  H  06/27/18  18:40    


 


Hemoglobin A1c %  5.1 % (4.0-6.0)   06/27/18  18:40    


 


Calcium  9.6 mg/dL (8.6-10.3)   06/27/18  18:40    


 


Total Bilirubin  0.5 mg/dL (0.3-1.0)   06/27/18  18:40    


 


AST  20 U/L (13-39)   06/27/18  18:40    


 


ALT  5 U/L (7-52)  L  06/27/18  18:40    


 


Alkaline Phosphatase  107 U/L ()  H  06/27/18  18:40    


 


Creatine Kinase  21 U/L ()  L  06/27/18  18:40    


 


Troponin I  0.20 ng/mL (0.01-0.05)  H*  06/27/18  18:40    


 


B-Natriuretic Peptide  163.0 pg/mL (5.0-100.0)  H  06/27/18  18:40    


 


Total Protein  7.0 gm/dL (6.0-8.3)   06/27/18  18:40    


 


Albumin  3.2 gm/dL (3.7-5.3)  L  06/27/18  18:40    


 


Globulin  3.8 gm/dL  06/27/18  18:40    


 


Albumin/Globulin Ratio  0.8  (1.0-1.8)  L  06/27/18  18:40    


 


Triglycerides  143 mg/dL (<150)   06/27/18  18:40    


 


Cholesterol  130 mg/dL (<200)   06/27/18  18:40    


 


LDL Cholesterol Direct  93 mg/dL ()   06/27/18  18:40    


 


HDL Cholesterol  24 mg/dL (23-92)   06/27/18  18:40    


 


Amylase  12 U/L ()  L  06/27/18  18:40    


 


Lipase  6 U/L (11-82)  L  06/27/18  18:40    


 


Urine Source  CATH   06/28/18  05:05    


 


Urine Color  YELLOW   06/28/18  05:05    


 


Urine Clarity  TURBID  (CLEAR)  H  06/28/18  05:05    


 


Urine pH  5.5  (4.6 - 8.0)   06/28/18  05:05    


 


Ur Specific Gravity  1.020  (1.005-1.030)   06/28/18  05:05    


 


Urine Protein  TRACE mg/dL (NEGATIVE)   06/28/18  05:05    


 


Urine Glucose (UA)  NEGATIVE mg/dL (NEGATIVE)   06/28/18  05:05    


 


Urine Ketones  15 mg/dL (NEGATIVE)  H  06/28/18  05:05    


 


Urine Blood  NEGATIVE  (NEGATIVE)   06/28/18  05:05    


 


Urine Nitrate  POSITIVE  (NEGATIVE)  H  06/28/18  05:05    


 


Urine Bilirubin  NEGATIVE  (NEGATIVE)   06/28/18  05:05    


 


Urine Urobilinogen  1.0 E.U./dL (0.2 - 1.0)   06/28/18  05:05    


 


Ur Leukocyte Esterase  MODERATE  (NEGATIVE)  H  06/28/18  05:05    


 


Urine RBC  0-2 /hpf (0-5)   06/28/18  05:05    


 


Urine WBC   /hpf (0-5)  H  06/28/18  05:05    


 


Ur Epithelial Cells  MODERATE /lpf (FEW)   06/28/18  05:05    


 


Urine Bacteria  4+ /hpf (NONE SEEN)  H  06/28/18  05:05    














- Physical Exam


Vitals and I&O: 


 Vital Signs











Temp  98.4 F   06/29/18 11:40


 


Pulse  120   06/29/18 11:40


 


Resp  18   06/29/18 11:40


 


BP  130/80   06/29/18 11:40


 


Pulse Ox  95   06/29/18 11:40








 Intake & Output











 06/28/18 06/29/18 06/29/18





 18:59 06:59 18:59


 


Intake Total  1000 50


 


Balance  1000 50


 


Weight (lbs) 73.981 kg 75.296 kg 75.296 kg


 


Intake:   


 


  Intake, IV Amount  1000 


 


    Sodium Chloride 0.9% 1,  1000 





    000 ml @ 125 mls/hr IV .   





    Q8H [facility] Rx#:[id number]   


 


  Oral   50


 


Other:   


 


  # Voids 4 2 2


 


  # Bowel Movements  0 0


 


  Weight Source Bedscale Bedscale Bedscale











Active Medications: 


Current Medications





Acetaminophen (Tylenol)  650 mg PO Q6H PRN


   PRN Reason: Pain (Mild)


   Stop: 08/27/18 02:01


Acetaminophen/Hydrocodone Bitart (Norco 5mg/325mg)  1 tab PO Q6H [facility]


   Stop: 08/26/18 21:29


   Last Admin: 06/29/18 09:50 Dose:  Not Given


Albuterol Sulfate (Albuterol 2.5mg/3ml Neb Ud)  1.25 mg HHN Q2HRT PRN


   PRN Reason: Shortness of Breath


   Stop: 08/27/18 02:12


   Last Admin: 06/28/18 07:14 Dose:  1.25 mg


Ascorbic Acid (Vitamin C)  500 mg PO DAILY [facility]


   Stop: 08/27/18 08:59


   Last Admin: 06/29/18 08:30 Dose:  Not Given


Clopidogrel Bisulfate (Plavix)  75 mg PO DAILY [facility]


   Stop: 08/27/18 08:59


   Last Admin: 06/29/18 08:30 Dose:  Not Given


Diltiazem HCl (Cardizem)  30 mg PO BID [facility]


   Stop: 08/27/18 08:59


   Last Admin: 06/29/18 08:30 Dose:  Not Given


Docusate Sodium (Colace)  100 mg PO BID [facility]


   Stop: 08/27/18 08:59


   Last Admin: 06/29/18 08:30 Dose:  Not Given


Donepezil HCl (Aricept)  10 mg PO HS [facility]


   Stop: 08/26/18 21:59


   Last Admin: 06/28/18 20:15 Dose:  Not Given


Gabapentin (Neurontin)  600 mg PO Q6HR [facility]


   Stop: 08/27/18 05:59


   Last Admin: 06/29/18 12:42 Dose:  Not Given


Piperacillin Sod/Tazobactam (Sod 3.375 gm/ Sodium Chloride)  50 mls @ 100 mls/

hr IV Q8HR [facility]


   Stop: 08/27/18 04:59


Sodium Chloride (Nacl 0.9%)  1,000 mls @ 125 mls/hr IV .Q8H [facility]


   Stop: 08/26/18 23:44


   Last Admin: 06/29/18 03:09 Dose:  125 mls/hr


Levofloxacin (Levaquin Pb)  250 mg in 50 mls @ 50 mls/hr IV Q24HR [facility]


   Stop: 08/27/18 00:59


   Last Admin: 06/29/18 01:00 Dose:  50 mls/hr


Levothyroxine Sodium (Synthroid)  0.025 mg PO DAILY@0730 [facility]


   Stop: 08/27/18 07:29


   Last Admin: 06/29/18 08:30 Dose:  Not Given


Montelukast Sodium (Singulair)  10 mg PO DAILY [facility]


   Stop: 08/27/18 08:59


   Last Admin: 06/29/18 08:31 Dose:  Not Given


Morphine Sulfate (Morphine)  1 mg IVP Q4HR PRN


   PRN Reason: Moderate Pain


   Stop: 08/26/18 21:35


   Last Admin: 06/29/18 09:38 Dose:  1 mg


Morphine Sulfate (Morphine)  2 mg IVP Q4HR PRN


   PRN Reason: Severe Pain


   Stop: 08/26/18 21:36


   Last Admin: 06/29/18 03:43 Dose:  2 mg


Mupirocin (Bactroban Oint)  1 appl NS BID [facility]


   Stop: 07/04/18 09:01


Ondansetron HCl (Zofran)  4 mg IV Q6H PRN


   PRN Reason: Nausea / Vomiting


   Stop: 08/26/18 21:34


   Last Admin: 06/28/18 06:04 Dose:  4 mg


Pantoprazole Sodium (Protonix)  40 mg PO QDAC [facility]


   Stop: 08/27/18 07:29


   Last Admin: 06/29/18 08:30 Dose:  Not Given


Prednisone (Deltasone)  5 mg PO DAILY [facility]


   Stop: 08/27/18 08:59


   Last Admin: 06/29/18 08:31 Dose:  Not Given








General: No acute distress, Mild distress


Neck: Supple


Cardiovascular: Regular rate


Lungs: Clear to auscultation


Abdomen: Bowel sounds, Soft, Tender (DIFFUSE), Distended (MILD)





Assessment/Plan





- Assessment


Assessment: 





1. ABD PAIN WITH CT SHOWED SIGMOID DIVERTICULITIS.


2. HIATAL HERNIA.


3. CHOLELITHIASIS, LESS LIKELY ACUTE CHOLECYSTITIS.


4. CHEST PAIN WITH ELEVATED TROPONIN.





- Plan


Plan: 





1. ABX.


2. NPO.


3. PAIN CONTROL MEASURES.


4. IVF.


5. PER CARDS.

## 2018-06-29 NOTE — CONSULTATION
DATE OF CONSULTATION:  06/28/2018



The patient of Dr. Germain.



HISTORY OF PRESENT ILLNESS:  This is an 87-year-old female patient who was

recently discharged from [facility].  The patient has been

complaining of abdominal pain, nausea, and vomiting.  Following this, the

patient came to the Emergency Room.  The patient has slightly elevated troponin

level and hence Cardiology consult was requested.



PAST MEDICAL HISTORY:  The patient has a history of stable angina; NONSTEMI,

myocardial infarction; hypertension; Sjogren syndrome; chronic hypoxia;

hypothyroid; dermatitis; GERD; dementia; diabetes mellitus type 2; diabetic CKD,

stage III; diabetic peripheral neuropathy; stage II sacral decubitus ulcer;

urinary tract infection; and osteoporosis.



FAMILY HISTORY:  Unremarkable.



SOCIAL HISTORY:  No history of smoking, alcohol abuse.



ALLERGIES:  No known allergies.



PHYSICAL EXAMINATION:

VITAL SIGNS:  Blood pressure 130/80, pulse 90, and respirations 28.

HEAD:  Normocephalic.  No lumps or bumps.

EYES:  Pupils equal, reactive to light.  Fundi show AV nicking, sclerae white,

conjunctivae pink.

NECK:  Carotid 2+.  Normal upstroke.  JVD flat.  Thyroid not palpable.  Lymph

nodes not palpable.

CHEST:  Shows increased AP diameter.  No kyphosis, scoliosis.

LUNGS:  Bilateral rales.  Decreased breath sounds both the bases.

HEART:  PMI sixth intercostal space with lateral to midclavicular line.  S1, S2,

S3, S4.  Soft systolic murmur.

ABDOMEN:  Soft.  Liver, spleen not palpable.  Tenderness diffuse.  No rebound

tenderness.  Bowel sounds active.

RECTAL:  Hemoccult negative.

EXTREMITIES:  Peripheral pulses 2+.  No pedal edema.



CLINICAL IMPRESSION:  Acute respiratory failure; non-ST elevated myocardial

infarction; stable angina; hypertension; Sjogren syndrome; chronic hypoxia;

hypothyroid; dermatitis; GERD, dementia; diabetic chronic kidney disease, stage

III; diabetic peripheral neuropathy; stage II sacral decubitus ulcer; urinary

tract infection, and osteoporosis.



PLAN:  The patient to continue present care.  Repeat troponin level. 

Echocardiogram.





DD: 06/28/2018 11:45

DT: 06/29/2018 00:11

JOB# [id number]  [id number]

## 2018-06-29 NOTE — INFECTIOUS DISEASE PROG NOTE
Infectious Disease Subjective





- Review of Systems


Service Date: 18


Subjective: 





There is no new change, still c/o abdominal pain in lower abdomen.  No fever.





Infectious Disease Objective





- Results


Result Diagrams: 


 18 18:40





 18 18:40


Recent Labs: 


 Laboratory Last Values











WBC  8.8 Th/cmm (4.8-10.8)   18  18:40    


 


RBC  5.00 Mil/cmm (3.80-5.20)   18  18:40    


 


Hgb  15.1 gm/dL (12-16)   18  18:40    


 


Hct  46.5 % (41.0-60)   18  18:40    


 


MCV  93.1 fl ()   18  18:40    


 


MCH  30.3 pg (27.0-31.0)   18  18:40    


 


MCHC Differential  32.6 pg (28.0-36.0)   18  18:40    


 


RDW  14.1 % (11.5-20.0)   18  18:40    


 


Plt Count  427 Th/cmm (150-400)  H  18  18:40    


 


MPV  6.8 fl  18  18:40    


 


Neutrophils %  78.8 % (40.0-80.0)   18  18:40    


 


Lymphocytes %  15.2 % (20.0-50.0)  L  18  18:40    


 


Monocytes %  5.7 % (2.0-10.0)   18  18:40    


 


Eosinophils %  0.3 % (0.0-5.0)   18  18:40    


 


Basophils %  0.0 % (0.0-2.0)   18  18:40    


 


PT  11.0 SECONDS (9.5-11.5)   18  18:40    


 


INR  1.06  (0.5-1.4)   18  18:40    


 


Specimen Source  arterial   18  18:58    


 


Sample Site  rr   18  18:58    


 


pH  7.38  (7.35-7.45)   18  18:58    


 


pCO2  41.0 mmHg (35.0-45.0)   18  18:58    


 


pO2  44.0 mmHg (80.0-100.0)  L*  18  18:58    


 


HCO3  23.9 mEq/L (20.0-26.0)   18  18:58    


 


Base Excess  -0.8 mEq/L (-3.0-3.0)   18  18:58    


 


O2 Saturation  79.0 % (92.0-100.0)  L  18  18:58    


 


Magdaleno Test  y   18  18:58    


 


Vent Rate  N/A   18  18:58    


 


Inspired O2  36   18  18:58    


 


Tidal Volume  N/A   18  18:58    


 


PEEP  N/A   18  18:58    


 


Pressure (ins/psv/peep)  N/A   18  18:58    


 


Critical Value  MM/JY   18  18:58    


 


Sodium  135 mEq/L (136-145)  L  18  18:40    


 


Potassium  4.0 mEq/L (3.5-5.1)   18  18:40    


 


Chloride  99 mEq/L ()   18  18:40    


 


Carbon Dioxide  23.0 mEq/L (21.0-31.0)   18  18:40    


 


Anion Gap  17.0  (7.0-16.0)  H  18  18:40    


 


BUN  15 mg/dL (7-25)   18  18:40    


 


Creatinine  1.1 mg/dL (0.6-1.2)   18  18:40    


 


Est GFR ( Amer)  TNP   18  18:40    


 


Est GFR (Non-Af Amer)  TNP   18  18:40    


 


BUN/Creatinine Ratio  13.6   18  18:40    


 


Glucose  190 mg/dL ()  H  18  18:40    


 


Hemoglobin A1c %  5.1 % (4.0-6.0)   18  18:40    


 


Calcium  9.6 mg/dL (8.6-10.3)   18  18:40    


 


Total Bilirubin  0.5 mg/dL (0.3-1.0)   18  18:40    


 


AST  20 U/L (13-39)   18  18:40    


 


ALT  5 U/L (7-52)  L  18  18:40    


 


Alkaline Phosphatase  107 U/L ()  H  18  18:40    


 


Creatine Kinase  21 U/L ()  L  18  18:40    


 


Troponin I  0.20 ng/mL (0.01-0.05)  H*  18  18:40    


 


B-Natriuretic Peptide  163.0 pg/mL (5.0-100.0)  H  18  18:40    


 


Total Protein  7.0 gm/dL (6.0-8.3)   18  18:40    


 


Albumin  3.2 gm/dL (3.7-5.3)  L  18  18:40    


 


Globulin  3.8 gm/dL  18  18:40    


 


Albumin/Globulin Ratio  0.8  (1.0-1.8)  L  18  18:40    


 


Triglycerides  143 mg/dL (<150)   18  18:40    


 


Cholesterol  130 mg/dL (<200)   18  18:40    


 


LDL Cholesterol Direct  93 mg/dL ()   18  18:40    


 


HDL Cholesterol  24 mg/dL (23-92)   18  18:40    


 


Amylase  12 U/L ()  L  18  18:40    


 


Lipase  6 U/L (11-82)  L  18  18:40    


 


Urine Source  CATH   18  05:05    


 


Urine Color  YELLOW   18  05:05    


 


Urine Clarity  TURBID  (CLEAR)  H  18  05:05    


 


Urine pH  5.5  (4.6 - 8.0)   18  05:05    


 


Ur Specific Gravity  1.020  (1.005-1.030)   18  05:05    


 


Urine Protein  TRACE mg/dL (NEGATIVE)   18  05:05    


 


Urine Glucose (UA)  NEGATIVE mg/dL (NEGATIVE)   18  05:05    


 


Urine Ketones  15 mg/dL (NEGATIVE)  H  18  05:05    


 


Urine Blood  NEGATIVE  (NEGATIVE)   18  05:05    


 


Urine Nitrate  POSITIVE  (NEGATIVE)  H  18  05:05    


 


Urine Bilirubin  NEGATIVE  (NEGATIVE)   18  05:05    


 


Urine Urobilinogen  1.0 E.U./dL (0.2 - 1.0)   18  05:05    


 


Ur Leukocyte Esterase  MODERATE  (NEGATIVE)  H  18  05:05    


 


Urine RBC  0-2 /hpf (0-5)   18  05:05    


 


Urine WBC   /hpf (0-5)  H  18  05:05    


 


Ur Epithelial Cells  MODERATE /lpf (FEW)   18  05:05    


 


Urine Bacteria  4+ /hpf (NONE SEEN)  H  18  05:05    














- Physical Exam


Vitals and I&O: 


 Vital Signs











Temp  98.4 F   18 11:40


 


Pulse  120   18 11:40


 


Resp  18   18 11:40


 


BP  130/80   18 11:40


 


Pulse Ox  95   18 11:40








 Intake & Output











 18





 18:59 06:59 18:59


 


Intake Total  1000 1050


 


Balance  1000 1050


 


Weight (lbs) 73.981 kg 75.296 kg 75.296 kg


 


Intake:   


 


  Intake, IV Amount  1000 1000


 


    Sodium Chloride 0.9% 1,  1000 1000





    000 ml @ 125 mls/hr IV .   





    Q8H [facility] Rx#:[id number]   


 


  Oral   50


 


Other:   


 


  # Voids 4 2 2


 


  # Bowel Movements  0 0


 


  Weight Source Bedscale Bedscale Bedscale











Active Medications: 


Current Medications





Acetaminophen (Tylenol)  650 mg PO Q6H PRN


   PRN Reason: Pain (Mild)


   Stop: 18 02:01


Acetaminophen/Hydrocodone Bitart (Norco 5mg/325mg)  1 tab PO Q6H [facility]


   Stop: 18 21:29


   Last Admin: 18 09:50 Dose:  Not Given


Albuterol Sulfate (Albuterol 2.5mg/3ml Neb Ud)  1.25 mg HHN Q2HRT PRN


   PRN Reason: Shortness of Breath


   Stop: 18 02:12


   Last Admin: 18 07:14 Dose:  1.25 mg


Ascorbic Acid (Vitamin C)  500 mg PO DAILY [facility]


   Stop: 18 08:59


   Last Admin: 18 08:30 Dose:  Not Given


Clopidogrel Bisulfate (Plavix)  75 mg PO DAILY [facility]


   Stop: 18 08:59


   Last Admin: 18 08:30 Dose:  Not Given


Diltiazem HCl (Cardizem)  30 mg PO BID [facility]


   Stop: 18 08:59


   Last Admin: 18 08:30 Dose:  Not Given


Docusate Sodium (Colace)  100 mg PO BID [facility]


   Stop: 18 08:59


   Last Admin: 18 08:30 Dose:  Not Given


Donepezil HCl (Aricept)  10 mg PO HS [facility]


   Stop: 18 21:59


   Last Admin: 18 20:15 Dose:  Not Given


Gabapentin (Neurontin)  600 mg PO Q6HR [facility]


   Stop: 18 05:59


   Last Admin: 18 12:42 Dose:  Not Given


Sodium Chloride (Nacl 0.9%)  1,000 mls @ 125 mls/hr IV .Q8H [facility]


   Stop: 18 23:44


   Last Admin: 18 14:03 Dose:  125 mls/hr


Levofloxacin (Levaquin Pb)  250 mg in 50 mls @ 50 mls/hr IV Q24HR [facility]


   Stop: 18 00:59


   Last Admin: 18 01:00 Dose:  50 mls/hr


Levothyroxine Sodium (Synthroid)  0.025 mg PO DAILY@0730 [facility]


   Stop: 18 07:29


   Last Admin: 18 08:30 Dose:  Not Given


Montelukast Sodium (Singulair)  10 mg PO DAILY [facility]


   Stop: 18 08:59


   Last Admin: 18 08:31 Dose:  Not Given


Morphine Sulfate (Morphine)  1 mg IVP Q4HR PRN


   PRN Reason: Moderate Pain


   Stop: 18 21:35


   Last Admin: 18 14:02 Dose:  1 mg


Morphine Sulfate (Morphine)  2 mg IVP Q4HR PRN


   PRN Reason: Severe Pain


   Stop: 18 21:36


   Last Admin: 18 03:43 Dose:  2 mg


Mupirocin (Bactroban Oint)  1 appl NS BID [facility]


   Stop: 18 09:01


Ondansetron HCl (Zofran)  4 mg IV Q6H PRN


   PRN Reason: Nausea / Vomiting


   Stop: 18 21:34


   Last Admin: 18 06:04 Dose:  4 mg


Pantoprazole Sodium (Protonix)  40 mg PO QDAC [facility]


   Stop: 18 07:29


   Last Admin: 18 08:30 Dose:  Not Given


Prednisone (Deltasone)  5 mg PO DAILY [facility]


   Stop: 18 08:59


   Last Admin: 18 08:31 Dose:  Not Given








General: no acute distress, well developed, well nourished


HEENT: atraumatic, normocephalic, PERRLA, EOMI


Neck: supple, no thyromegaly


Cardiovascular: S1S2, regular


Lungs: clear to auscultation bilaterally, clear to percussion


Abdomen: soft, tender, bowel sounds, no distended, no mass


Extremities: no cyanosis, no clubbing, no edema


Neurological: awake, alert, oriented


Skin: intact





Infectious Disease Assmt/Plan





- Assessment


Assessment: 


1.  Leukocytosis.


2.  Abdominal pain.


3.  Diabetes mellitus type 2.


4.  Hypertension.


5.  Hyperlipidemia.











- Plan


Plan: 





Continue Levaquin.





Nutritional Asmnt/Malnutr-PDOC





- Dietary Evaluation


Malnutrition Findings (Please click <Entered> for more info): 








Nutritional Asmnt/Malnutrition                             Start:  18 14:

47


Text:                                                      Status: Complete    

  


Freq:                                                                          

  


Protocol:                                                                      

  


 Document     18 14:47  LCHENG  (Rec: 18 15:32  [id number]  [id number])


 Nutritional Asmnt/Malnutrition


     Patient General Information


      Nutritional Screening                      High Risk


      Diagnosis                                  elevated troponin, hypoxia,


                                                 possible PNA


      Pertinent Medical Hx/Surgical Hx           HTN, CAD, asthma/COPD, PUD/


                                                 GERD, ESRD, dementia


      Subjective Information                     Pt seen on mask at time of


                                                 visit. Pt was NPO for CT


                                                 scheduled today, no PO intake


                                                 information available.


      Current Diet Order/ Nutrition Support      NPO


      Pertinent Medications                      vit C, colace, levaquin,


                                                 synthorid, zofran, nacl 0.9%,


                                                 piperacillin, protonix


      Pertinent Labs                              Na 135, Glucose 190, A1c


                                                 5.1


     Nutritional Hx/Data


      Height                                     1.63 m


      Height (Calculated Centimeters)            162.6


      Current Weight (lbs)                       73.936 kg


      Weight (Calculated Kilograms)              73.9


      Weight (Calculated Grams)                  42627.6


      Ideal Body Weight                          120


      Body Mass Index (BMI)                      27.9


      Weight Status                              Overweight


     GI Symptoms


      GI Symptoms                                None


      Last BM                                    none


      Difficult in:                              None


      Skin Integrity/Comment:                    reddened to coccyx, buttocks


     Estimated Nutritional Goals


      BEE in Kcals:                              Using Current wt


      Calories/Kcals/Kg                          23-27


      Kcals Calculated                           7276-1922


      Protein g/k


      Protein Calculated                         74


      Fluid: ml                                  1702-1998ml (1ml/kcal)


     Nutritional Problem


      1. Problem


       Problem                                   altered nutrition related labs


       Etiology                                  hyperglycemia


       Signs/Symptoms:                           glucose 190 at admission


     Malnutrition Alert


      Is there a minimum of two criteria         No


       selected?                                 


       Query Text:Check all the applicable       


       criteria. A minimum of two criteria are   


       recommended for diagnosis of either       


       severe or non-severe malnutrition.        


     Malnutrition Related to Morbid Obesity


      Malnutrition related to morbid obesity     No


     Intervention/Recommendation


      Comments                                   1. Resume pureed JOJO diet


                                                 after CT exam. Assist pt with


                                                 meals. If glucose continue


                                                 high, will consider adding


                                                 CCHO diet.


                                                 2. Monitor PO intake, wt, labs


                                                 and skin integrity


                                                 3. F/U as high risk in 2-3


                                                 days, -


     Expected Outcomes/Goals


      Expected Outcomes/Goals                    1. PO intake to meet at least


                                                 75% of nutritional needs.


                                                 2. Wt stability, skin to


                                                 remain intact, labs to


                                                 approach WNL.

## 2018-06-29 NOTE — INFECTIOUS DISEASE PROG NOTE
Infectious Disease Subjective





- Review of Systems


Service Date: 18


Subjective: 





There is no new change, still c/o abdominal pain in lower abdomen.  No fever.





Infectious Disease Objective





- Results


Result Diagrams: 


 18 18:40





 18 18:40


Recent Labs: 


 Laboratory Last Values











WBC  8.8 Th/cmm (4.8-10.8)   18  18:40    


 


RBC  5.00 Mil/cmm (3.80-5.20)   18  18:40    


 


Hgb  15.1 gm/dL (12-16)   18  18:40    


 


Hct  46.5 % (41.0-60)   18  18:40    


 


MCV  93.1 fl ()   18  18:40    


 


MCH  30.3 pg (27.0-31.0)   18  18:40    


 


MCHC Differential  32.6 pg (28.0-36.0)   18  18:40    


 


RDW  14.1 % (11.5-20.0)   18  18:40    


 


Plt Count  427 Th/cmm (150-400)  H  18  18:40    


 


MPV  6.8 fl  18  18:40    


 


Neutrophils %  78.8 % (40.0-80.0)   18  18:40    


 


Lymphocytes %  15.2 % (20.0-50.0)  L  18  18:40    


 


Monocytes %  5.7 % (2.0-10.0)   18  18:40    


 


Eosinophils %  0.3 % (0.0-5.0)   18  18:40    


 


Basophils %  0.0 % (0.0-2.0)   18  18:40    


 


PT  11.0 SECONDS (9.5-11.5)   18  18:40    


 


INR  1.06  (0.5-1.4)   18  18:40    


 


Specimen Source  arterial   18  18:58    


 


Sample Site  rr   18  18:58    


 


pH  7.38  (7.35-7.45)   18  18:58    


 


pCO2  41.0 mmHg (35.0-45.0)   18  18:58    


 


pO2  44.0 mmHg (80.0-100.0)  L*  18  18:58    


 


HCO3  23.9 mEq/L (20.0-26.0)   18  18:58    


 


Base Excess  -0.8 mEq/L (-3.0-3.0)   18  18:58    


 


O2 Saturation  79.0 % (92.0-100.0)  L  18  18:58    


 


Magdaleno Test  y   18  18:58    


 


Vent Rate  N/A   18  18:58    


 


Inspired O2  36   18  18:58    


 


Tidal Volume  N/A   18  18:58    


 


PEEP  N/A   18  18:58    


 


Pressure (ins/psv/peep)  N/A   18  18:58    


 


Critical Value  MM/JY   18  18:58    


 


Sodium  135 mEq/L (136-145)  L  18  18:40    


 


Potassium  4.0 mEq/L (3.5-5.1)   18  18:40    


 


Chloride  99 mEq/L ()   18  18:40    


 


Carbon Dioxide  23.0 mEq/L (21.0-31.0)   18  18:40    


 


Anion Gap  17.0  (7.0-16.0)  H  18  18:40    


 


BUN  15 mg/dL (7-25)   18  18:40    


 


Creatinine  1.1 mg/dL (0.6-1.2)   18  18:40    


 


Est GFR ( Amer)  TNP   18  18:40    


 


Est GFR (Non-Af Amer)  TNP   18  18:40    


 


BUN/Creatinine Ratio  13.6   18  18:40    


 


Glucose  190 mg/dL ()  H  18  18:40    


 


Hemoglobin A1c %  5.1 % (4.0-6.0)   18  18:40    


 


Calcium  9.6 mg/dL (8.6-10.3)   18  18:40    


 


Total Bilirubin  0.5 mg/dL (0.3-1.0)   18  18:40    


 


AST  20 U/L (13-39)   18  18:40    


 


ALT  5 U/L (7-52)  L  18  18:40    


 


Alkaline Phosphatase  107 U/L ()  H  18  18:40    


 


Creatine Kinase  21 U/L ()  L  18  18:40    


 


Troponin I  0.20 ng/mL (0.01-0.05)  H*  18  18:40    


 


B-Natriuretic Peptide  163.0 pg/mL (5.0-100.0)  H  18  18:40    


 


Total Protein  7.0 gm/dL (6.0-8.3)   18  18:40    


 


Albumin  3.2 gm/dL (3.7-5.3)  L  18  18:40    


 


Globulin  3.8 gm/dL  18  18:40    


 


Albumin/Globulin Ratio  0.8  (1.0-1.8)  L  18  18:40    


 


Triglycerides  143 mg/dL (<150)   18  18:40    


 


Cholesterol  130 mg/dL (<200)   18  18:40    


 


LDL Cholesterol Direct  93 mg/dL ()   18  18:40    


 


HDL Cholesterol  24 mg/dL (23-92)   18  18:40    


 


Amylase  12 U/L ()  L  18  18:40    


 


Lipase  6 U/L (11-82)  L  18  18:40    


 


Urine Source  CATH   18  05:05    


 


Urine Color  YELLOW   18  05:05    


 


Urine Clarity  TURBID  (CLEAR)  H  18  05:05    


 


Urine pH  5.5  (4.6 - 8.0)   18  05:05    


 


Ur Specific Gravity  1.020  (1.005-1.030)   18  05:05    


 


Urine Protein  TRACE mg/dL (NEGATIVE)   18  05:05    


 


Urine Glucose (UA)  NEGATIVE mg/dL (NEGATIVE)   18  05:05    


 


Urine Ketones  15 mg/dL (NEGATIVE)  H  18  05:05    


 


Urine Blood  NEGATIVE  (NEGATIVE)   18  05:05    


 


Urine Nitrate  POSITIVE  (NEGATIVE)  H  18  05:05    


 


Urine Bilirubin  NEGATIVE  (NEGATIVE)   18  05:05    


 


Urine Urobilinogen  1.0 E.U./dL (0.2 - 1.0)   18  05:05    


 


Ur Leukocyte Esterase  MODERATE  (NEGATIVE)  H  18  05:05    


 


Urine RBC  0-2 /hpf (0-5)   18  05:05    


 


Urine WBC   /hpf (0-5)  H  18  05:05    


 


Ur Epithelial Cells  MODERATE /lpf (FEW)   18  05:05    


 


Urine Bacteria  4+ /hpf (NONE SEEN)  H  18  05:05    














- Physical Exam


Vitals and I&O: 


 Vital Signs











Temp  98.4 F   18 11:40


 


Pulse  120   18 11:40


 


Resp  18   18 11:40


 


BP  130/80   18 11:40


 


Pulse Ox  95   18 11:40








 Intake & Output











 18





 18:59 06:59 18:59


 


Intake Total  1000 1050


 


Balance  1000 1050


 


Weight (lbs) 73.981 kg 75.296 kg 75.296 kg


 


Intake:   


 


  Intake, IV Amount  1000 1000


 


    Sodium Chloride 0.9% 1,  1000 1000





    000 ml @ 125 mls/hr IV .   





    Q8H [facility] Rx#:[id number]   


 


  Oral   50


 


Other:   


 


  # Voids 4 2 2


 


  # Bowel Movements  0 0


 


  Weight Source Bedscale Bedscale Bedscale











Active Medications: 


Current Medications





Acetaminophen (Tylenol)  650 mg PO Q6H PRN


   PRN Reason: Pain (Mild)


   Stop: 18 02:01


Acetaminophen/Hydrocodone Bitart (Norco 5mg/325mg)  1 tab PO Q6H [facility]


   Stop: 18 21:29


   Last Admin: 18 09:50 Dose:  Not Given


Albuterol Sulfate (Albuterol 2.5mg/3ml Neb Ud)  1.25 mg HHN Q2HRT PRN


   PRN Reason: Shortness of Breath


   Stop: 18 02:12


   Last Admin: 18 07:14 Dose:  1.25 mg


Ascorbic Acid (Vitamin C)  500 mg PO DAILY [facility]


   Stop: 18 08:59


   Last Admin: 18 08:30 Dose:  Not Given


Clopidogrel Bisulfate (Plavix)  75 mg PO DAILY [facility]


   Stop: 18 08:59


   Last Admin: 18 08:30 Dose:  Not Given


Diltiazem HCl (Cardizem)  30 mg PO BID [facility]


   Stop: 18 08:59


   Last Admin: 18 08:30 Dose:  Not Given


Docusate Sodium (Colace)  100 mg PO BID [facility]


   Stop: 18 08:59


   Last Admin: 18 08:30 Dose:  Not Given


Donepezil HCl (Aricept)  10 mg PO HS [facility]


   Stop: 18 21:59


   Last Admin: 18 20:15 Dose:  Not Given


Gabapentin (Neurontin)  600 mg PO Q6HR [facility]


   Stop: 18 05:59


   Last Admin: 18 12:42 Dose:  Not Given


Sodium Chloride (Nacl 0.9%)  1,000 mls @ 125 mls/hr IV .Q8H [facility]


   Stop: 18 23:44


   Last Admin: 18 14:03 Dose:  125 mls/hr


Levofloxacin (Levaquin Pb)  250 mg in 50 mls @ 50 mls/hr IV Q24HR [facility]


   Stop: 18 00:59


   Last Admin: 18 01:00 Dose:  50 mls/hr


Levothyroxine Sodium (Synthroid)  0.025 mg PO DAILY@0730 [facility]


   Stop: 18 07:29


   Last Admin: 18 08:30 Dose:  Not Given


Montelukast Sodium (Singulair)  10 mg PO DAILY [facility]


   Stop: 18 08:59


   Last Admin: 18 08:31 Dose:  Not Given


Morphine Sulfate (Morphine)  1 mg IVP Q4HR PRN


   PRN Reason: Moderate Pain


   Stop: 18 21:35


   Last Admin: 18 14:02 Dose:  1 mg


Morphine Sulfate (Morphine)  2 mg IVP Q4HR PRN


   PRN Reason: Severe Pain


   Stop: 18 21:36


   Last Admin: 18 03:43 Dose:  2 mg


Mupirocin (Bactroban Oint)  1 appl NS BID [facility]


   Stop: 18 09:01


Ondansetron HCl (Zofran)  4 mg IV Q6H PRN


   PRN Reason: Nausea / Vomiting


   Stop: 18 21:34


   Last Admin: 18 06:04 Dose:  4 mg


Pantoprazole Sodium (Protonix)  40 mg PO QDAC [facility]


   Stop: 18 07:29


   Last Admin: 18 08:30 Dose:  Not Given


Prednisone (Deltasone)  5 mg PO DAILY [facility]


   Stop: 18 08:59


   Last Admin: 18 08:31 Dose:  Not Given








General: no acute distress, well developed, well nourished


HEENT: atraumatic, normocephalic, PERRLA, EOMI, moist mucous membrane


Neck: supple, no thyromegaly


Cardiovascular: S1S2, regular


Lungs: clear to auscultation bilaterally, clear to percussion


Abdomen: soft, tender, no distended, no mass


Extremities: no cyanosis, no clubbing, no edema


Neurological: awake, alert, oriented


Skin: intact





Infectious Disease Assmt/Plan





- Assessment


Assessment: 


1.  Leukocytosis.


2.  Abdominal pain 2/2 Diverticulitis.


3.  UTI


4.  Hypertension.


5.  Hyperlipidemia.


6. Diabetes mellitus type 2.











- Plan


Plan: 





Continue Levaquin. Add flagyl.





Nutritional Asmnt/Malnutr-PDOC





- Dietary Evaluation


Malnutrition Findings (Please click <Entered> for more info): 








Nutritional Asmnt/Malnutrition                             Start:  18 14:

47


Text:                                                      Status: Complete    

  


Freq:                                                                          

  


Protocol:                                                                      

  


 Document     18 14:47  LCMARIANAG  (Rec: 18 15:32  MARIANA  [id number])


 Nutritional Asmnt/Malnutrition


     Patient General Information


      Nutritional Screening                      High Risk


      Diagnosis                                  elevated troponin, hypoxia,


                                                 possible PNA


      Pertinent Medical Hx/Surgical Hx           HTN, CAD, asthma/COPD, PUD/


                                                 GERD, ESRD, dementia


      Subjective Information                     Pt seen on mask at time of


                                                 visit. Pt was NPO for CT


                                                 scheduled today, no PO intake


                                                 information available.


      Current Diet Order/ Nutrition Support      NPO


      Pertinent Medications                      vit C, colace, levaquin,


                                                 synthorid, zofran, nacl 0.9%,


                                                 piperacillin, protonix


      Pertinent Labs                              Na 135, Glucose 190, A1c


                                                 5.1


     Nutritional Hx/Data


      Height                                     1.63 m


      Height (Calculated Centimeters)            162.6


      Current Weight (lbs)                       73.936 kg


      Weight (Calculated Kilograms)              73.9


      Weight (Calculated Grams)                  06808.6


      Ideal Body Weight                          120


      Body Mass Index (BMI)                      27.9


      Weight Status                              Overweight


     GI Symptoms


      GI Symptoms                                None


      Last BM                                    none


      Difficult in:                              None


      Skin Integrity/Comment:                    reddened to coccyx, buttocks


     Estimated Nutritional Goals


      BEE in Kcals:                              Using Current wt


      Calories/Kcals/Kg                          23-27


      Kcals Calculated                           4659-9065


      Protein g/k


      Protein Calculated                         74


      Fluid: ml                                  1702-1998ml (1ml/kcal)


     Nutritional Problem


      1. Problem


       Problem                                   altered nutrition related labs


       Etiology                                  hyperglycemia


       Signs/Symptoms:                           glucose 190 at admission


     Malnutrition Alert


      Is there a minimum of two criteria         No


       selected?                                 


       Query Text:Check all the applicable       


       criteria. A minimum of two criteria are   


       recommended for diagnosis of either       


       severe or non-severe malnutrition.        


     Malnutrition Related to Morbid Obesity


      Malnutrition related to morbid obesity     No


     Intervention/Recommendation


      Comments                                   1. Resume pureed JOJO diet


                                                 after CT exam. Assist pt with


                                                 meals. If glucose continue


                                                 high, will consider adding


                                                 CCHO diet.


                                                 2. Monitor PO intake, wt, labs


                                                 and skin integrity


                                                 3. F/U as high risk in 2-3


                                                 days, -


     Expected Outcomes/Goals


      Expected Outcomes/Goals                    1. PO intake to meet at least


                                                 75% of nutritional needs.


                                                 2. Wt stability, skin to


                                                 remain intact, labs to


                                                 approach WNL.

## 2018-06-30 LAB
ALBUMIN SERPL-MCNC: 2.6 GM/DL (ref 3.7–5.3)
ALBUMIN/GLOB SERPL: 0.9 {RATIO} (ref 1–1.8)
ALP SERPL-CCNC: 89 U/L (ref 34–104)
ALT SERPL-CCNC: 1097 U/L (ref 7–52)
ANION GAP SERPL CALC-SCNC: 14.5 MMOL/L (ref 7–16)
AST SERPL-CCNC: 2915 U/L (ref 13–39)
BASOPHILS # BLD AUTO: 0 TH/CUMM (ref 0–0.2)
BASOPHILS NFR BLD AUTO: 0.1 % (ref 0–2)
BILIRUB SERPL-MCNC: 0.7 MG/DL (ref 0.3–1)
BUN SERPL-MCNC: 37 MG/DL (ref 7–25)
CALCIUM SERPL-MCNC: 7.6 MG/DL (ref 8.6–10.3)
CHLORIDE SERPL-SCNC: 111 MEQ/L (ref 98–107)
CHOLEST SERPL-MCNC: 82 MG/DL (ref ?–200)
CHOLEST SERPL-MCNC: 84 MG/DL (ref ?–200)
CO2 SERPL-SCNC: 18 MEQ/L (ref 21–31)
CREAT SERPL-MCNC: 1.9 MG/DL (ref 0.6–1.2)
EOSINOPHIL # BLD AUTO: 0 TH/CMM (ref 0.1–0.4)
EOSINOPHIL NFR BLD AUTO: 0.1 % (ref 0–5)
ERYTHROCYTE [DISTWIDTH] IN BLOOD BY AUTOMATED COUNT: 14.3 % (ref 11.5–20)
GLOBULIN SER-MCNC: 2.9 GM/DL
GLUCOSE SERPL-MCNC: 137 MG/DL (ref 70–105)
HCT VFR BLD CALC: 37.3 % (ref 41–60)
HDLC SERPL-MCNC: 17 MG/DL (ref 23–92)
HDLC SERPL-MCNC: 18 MG/DL (ref 23–92)
HGB BLD-MCNC: 12.6 GM/DL (ref 12–16)
INR PPP: 1.46 (ref 0.5–1.4)
LYMPHOCYTE AB SER FC-ACNC: 1.7 TH/CMM (ref 1.5–3)
LYMPHOCYTES NFR BLD AUTO: 15.6 % (ref 20–50)
MCH RBC QN AUTO: 31.1 PG (ref 27–31)
MCHC RBC AUTO-ENTMCNC: 33.8 PG (ref 28–36)
MCV RBC AUTO: 91.9 FL (ref 81–100)
MONOCYTES # BLD AUTO: 0.6 TH/CMM (ref 0.3–1)
MONOCYTES NFR BLD AUTO: 5.9 % (ref 2–10)
NEUTROPHILS # BLD: 8.5 TH/CMM (ref 1.8–8)
NEUTROPHILS NFR BLD AUTO: 78.3 % (ref 40–80)
PLATELET # BLD: 199 TH/CMM (ref 150–400)
PMV BLD AUTO: 7.7 FL
POTASSIUM SERPL-SCNC: 4.5 MEQ/L (ref 3.5–5.1)
PROTHROMBIN TIME: 15.5 SECONDS (ref 9.5–11.5)
RBC # BLD AUTO: 4.07 MIL/CMM (ref 3.8–5.2)
SODIUM SERPL-SCNC: 139 MEQ/L (ref 136–145)
TRIGL SERPL-MCNC: 145 MG/DL (ref ?–150)
TRIGL SERPL-MCNC: 149 MG/DL (ref ?–150)
WBC # BLD AUTO: 10.8 TH/CMM (ref 4.8–10.8)

## 2018-06-30 RX ADMIN — HYDROMORPHONE HYDROCHLORIDE PRN MG: 2 INJECTION INTRAMUSCULAR; INTRAVENOUS; SUBCUTANEOUS at 12:15

## 2018-06-30 RX ADMIN — METRONIDAZOLE SCH MLS/HR: 500 INJECTION, SOLUTION INTRAVENOUS at 20:53

## 2018-06-30 RX ADMIN — HYDROCODONE BITARTRATE AND ACETAMINOPHEN SCH: 5; 325 TABLET ORAL at 15:26

## 2018-06-30 RX ADMIN — DEXTROSE AND SODIUM CHLORIDE SCH MLS/HR: 5; .9 INJECTION, SOLUTION INTRAVENOUS at 16:35

## 2018-06-30 RX ADMIN — HYDROCODONE BITARTRATE AND ACETAMINOPHEN SCH: 5; 325 TABLET ORAL at 09:05

## 2018-06-30 RX ADMIN — HYDROCODONE BITARTRATE AND ACETAMINOPHEN SCH: 5; 325 TABLET ORAL at 21:30

## 2018-06-30 RX ADMIN — DILTIAZEM HYDROCHLORIDE SCH: 30 TABLET, FILM COATED ORAL at 00:01

## 2018-06-30 RX ADMIN — LEVOTHYROXINE SODIUM SCH: 100 TABLET ORAL at 06:39

## 2018-06-30 RX ADMIN — LEVOFLOXACIN SCH MLS/HR: 5 INJECTION INTRAVENOUS at 00:21

## 2018-06-30 RX ADMIN — DILTIAZEM HYDROCHLORIDE SCH: 30 TABLET, FILM COATED ORAL at 17:50

## 2018-06-30 RX ADMIN — HYDROCODONE BITARTRATE AND ACETAMINOPHEN SCH: 5; 325 TABLET ORAL at 03:59

## 2018-06-30 RX ADMIN — PANTOPRAZOLE SODIUM SCH: 40 TABLET, DELAYED RELEASE ORAL at 06:39

## 2018-06-30 RX ADMIN — DILTIAZEM HYDROCHLORIDE SCH: 30 TABLET, FILM COATED ORAL at 05:02

## 2018-06-30 RX ADMIN — DILTIAZEM HYDROCHLORIDE SCH: 30 TABLET, FILM COATED ORAL at 12:25

## 2018-06-30 NOTE — INFECTIOUS DISEASE PROG NOTE
Infectious Disease Subjective





- Review of Systems


Service Date: 18


Subjective: 


High tropnin, transferred to the ICU.


 She c/o abdominal pain in lower abdomen.  No fever.





Infectious Disease Objective





- Results


Result Diagrams: 


 18 09:59





 18 09:59


Recent Labs: 


 Laboratory Last Values











WBC  10.8 Th/cmm (4.8-10.8)   18  09:59    


 


RBC  4.07 Mil/cmm (3.80-5.20)   18  09:59    


 


Hgb  12.6 gm/dL (12-16)   18  09:59    


 


Hct  37.3 % (41.0-60)  L  18  09:59    


 


MCV  91.9 fl ()   18  09:59    


 


MCH  31.1 pg (27.0-31.0)  H  18  09:59    


 


MCHC Differential  33.8 pg (28.0-36.0)   18  09:59    


 


RDW  14.3 % (11.5-20.0)   18  09:59    


 


Plt Count  199 Th/cmm (150-400)   18  09:59    


 


MPV  7.7 fl  18  09:59    


 


Neutrophils %  78.3 % (40.0-80.0)   18  09:59    


 


Lymphocytes %  15.6 % (20.0-50.0)  L  18  09:59    


 


Monocytes %  5.9 % (2.0-10.0)   18  09:59    


 


Eosinophils %  0.1 % (0.0-5.0)   18  09:59    


 


Basophils %  0.1 % (0.0-2.0)   18  09:59    


 


PT  15.5 SECONDS (9.5-11.5)  H  18  11:49    


 


INR  1.46  (0.5-1.4)  H  18  11:49    


 


PTT (Actin FS)  34.3 SECONDS (26.0-38.0)   18  11:49    


 


Specimen Source  arterial   18  18:58    


 


Sample Site  rr   18  18:58    


 


pH  7.38  (7.35-7.45)   18  18:58    


 


pCO2  41.0 mmHg (35.0-45.0)   18  18:58    


 


pO2  44.0 mmHg (80.0-100.0)  L*  18  18:58    


 


HCO3  23.9 mEq/L (20.0-26.0)   18  18:58    


 


Base Excess  -0.8 mEq/L (-3.0-3.0)   18  18:58    


 


O2 Saturation  79.0 % (92.0-100.0)  L  18  18:58    


 


Magdaleno Test  y   18  18:58    


 


Vent Rate  N/A   18  18:58    


 


Inspired O2  36   18  18:58    


 


Tidal Volume  N/A   18  18:58    


 


PEEP  N/A   18  18:58    


 


Pressure (ins/psv/peep)  N/A   18  18:58    


 


Critical Value  MM/JY   18  18:58    


 


Sodium  139 mEq/L (136-145)   18  09:59    


 


Potassium  4.5 mEq/L (3.5-5.1)   18  09:59    


 


Chloride  111 mEq/L ()  H  18  09:59    


 


Carbon Dioxide  18.0 mEq/L (21.0-31.0)  L  18  09:59    


 


Anion Gap  14.5  (7.0-16.0)   18  09:59    


 


BUN  37 mg/dL (7-25)  H  18  09:59    


 


Creatinine  1.9 mg/dL (0.6-1.2)  H  18  09:59    


 


Est GFR ( Amer)  TNP   18  09:59    


 


Est GFR (Non-Af Amer)  TNP   18  09:59    


 


BUN/Creatinine Ratio  19.5   18  09:59    


 


Glucose  137 mg/dL ()  H  18  09:59    


 


Hemoglobin A1c %  5.1 % (4.0-6.0)   18  18:40    


 


Calcium  7.6 mg/dL (8.6-10.3)  L  18  09:59    


 


Total Bilirubin  0.7 mg/dL (0.3-1.0)   18  09:59    


 


AST  2915 U/L (13-39)  H  18  09:59    


 


ALT  1097 U/L (7-52)  H  18  09:59    


 


Alkaline Phosphatase  89 U/L ()   18  09:59    


 


Creatine Kinase  21 U/L ()  L  18  18:40    


 


Troponin I  7.97 ng/mL (0.01-0.05)  H* D 18  12:37    


 


B-Natriuretic Peptide  163.0 pg/mL (5.0-100.0)  H  18  18:40    


 


Total Protein  5.5 gm/dL (6.0-8.3)  L  18  09:59    


 


Albumin  2.6 gm/dL (3.7-5.3)  L  18  09:59    


 


Globulin  2.9 gm/dL  18  09:59    


 


Albumin/Globulin Ratio  0.9  (1.0-1.8)  L  18  09:59    


 


Triglycerides  149 mg/dL (<150)   18  12:37    


 


Cholesterol  84 mg/dL (<200)   18  12:37    


 


LDL Cholesterol Direct  47 mg/dL ()  L  18  12:37    


 


HDL Cholesterol  18 mg/dL (23-92)  L  18  12:37    


 


Amylase  12 U/L ()  L  18  18:40    


 


Lipase  6 U/L (11-82)  L  18  18:40    


 


TSH  1.72 uIU/ml (0.34-5.60)   18  09:59    


 


Urine Source  CATH   18  05:05    


 


Urine Color  YELLOW   18  05:05    


 


Urine Clarity  TURBID  (CLEAR)  H  18  05:05    


 


Urine pH  5.5  (4.6 - 8.0)   18  05:05    


 


Ur Specific Gravity  1.020  (1.005-1.030)   18  05:05    


 


Urine Protein  TRACE mg/dL (NEGATIVE)   18  05:05    


 


Urine Glucose (UA)  NEGATIVE mg/dL (NEGATIVE)   18  05:05    


 


Urine Ketones  15 mg/dL (NEGATIVE)  H  18  05:05    


 


Urine Blood  NEGATIVE  (NEGATIVE)   18  05:05    


 


Urine Nitrate  POSITIVE  (NEGATIVE)  H  18  05:05    


 


Urine Bilirubin  NEGATIVE  (NEGATIVE)   18  05:05    


 


Urine Urobilinogen  1.0 E.U./dL (0.2 - 1.0)   18  05:05    


 


Ur Leukocyte Esterase  MODERATE  (NEGATIVE)  H  18  05:05    


 


Urine RBC  0-2 /hpf (0-5)   18  05:05    


 


Urine WBC   /hpf (0-5)  H  18  05:05    


 


Ur Epithelial Cells  MODERATE /lpf (FEW)   18  05:05    


 


Urine Bacteria  4+ /hpf (NONE SEEN)  H  18  05:05    














- Physical Exam


Vitals and I&O: 


 Vital Signs











Temp  97.5 F   18 13:00


 


Pulse  94   18 13:00


 


Resp  20   18 13:00


 


BP  113/63   18 13:00


 


Pulse Ox  99   18 13:00








 Intake & Output











 18





 18:59 06:59 18:59


 


Intake Total 1050 0 


 


Balance 1050 0 


 


Weight (lbs) 75.296 kg 74.843 kg 


 


Intake:   


 


  Intake, IV Amount 1000 2050 


 


    Levofloxacin 250mg/50mL  50 





    250 mg In 50 ml @ 50 mls/   





    hr IV [facility]24[facility]   





    [id number]   


 


    Sodium Chloride 0.9% 1, 1000 2000 





    000 ml @ 125 mls/hr IV .   





    Q8H [facility] Rx#:[id number]   


 


  Oral 50 0 


 


Other:   


 


  # Voids 2 3 


 


  # Bowel Movements 0 1 


 


  Stool Characteristics Formed  


 


  Weight Source Bedscale Bedscale 











Active Medications: 


Current Medications





Acetaminophen (Tylenol)  650 mg PO Q6H PRN


   PRN Reason: Pain (Mild)


   Stop: 18 02:01


Acetaminophen/Hydrocodone Bitart (Norco 5mg/325mg)  1 tab PO Q6H [facility]


   Stop: 18 21:29


   Last Admin: 18 09:05 Dose:  Not Given


Albuterol Sulfate (Albuterol 2.5mg/3ml Neb Ud)  1.25 mg HHN Q2HRT PRN


   PRN Reason: Shortness of Breath


   Stop: 18 02:12


   Last Admin: 18 07:14 Dose:  1.25 mg


Ascorbic Acid (Vitamin C)  500 mg PO DAILY [facility]


   Stop: 18 08:59


   Last Admin: 18 09:02 Dose:  Not Given


Carvedilol (Coreg)  6.25 mg PO BID [facility]


   Stop: 18 16:59


   Last Admin: 18 09:02 Dose:  Not Given


Clopidogrel Bisulfate (Plavix)  75 mg PO DAILY [facility]


   Stop: 18 08:59


   Last Admin: 18 09:03 Dose:  Not Given


Diltiazem HCl (Cardizem)  60 mg PO Q6HR [facility]


   Stop: 18 17:59


   Last Admin: 18 05:02 Dose:  Not Given


Docusate Sodium (Colace)  100 mg PO BID [facility]


   Stop: 18 08:59


   Last Admin: 18 09:03 Dose:  Not Given


Donepezil HCl (Aricept)  10 mg PO HS [facility]


   Stop: 18 21:59


   Last Admin: 18 20:12 Dose:  Not Given


Enoxaparin Sodium (Lovenox)  30 mg SUBQ DAILY [facility]


   Stop: 18 11:44


   Last Admin: 18 12:20 Dose:  30 mg


Gabapentin (Neurontin)  600 mg PO Q6HR [facility]


   Stop: 18 05:59


   Last Admin: 18 05:03 Dose:  Not Given


Hydromorphone HCl (Dilaudid)  1 mg IVP Q6H PRN


   PRN Reason: Pain (Moderate)


   Stop: 18 16:38


   Last Admin: 18 17:05 Dose:  1 mg


Hydromorphone HCl (Dilaudid)  2 mg IVP Q6H PRN


   PRN Reason: SEVERE PAIN


   Stop: 18 21:10


   Last Admin: 18 12:15 Dose:  2 mg


Sodium Chloride (Nacl 0.9%)  1,000 mls @ 125 mls/hr IV .Q8H [facility]


   Stop: 18 23:44


   Last Admin: 18 11:25 Dose:  125 mls/hr


Levofloxacin (Levaquin Pb)  250 mg in 50 mls @ 50 mls/hr IV Q24HR [facility]


   Stop: 18 00:59


   Last Infusion: 18 00:21 Dose:  Infused


Levothyroxine Sodium (Synthroid)  0.025 mg PO DAILY@0730 [facility]


   Stop: 18 07:29


   Last Admin: 18 06:39 Dose:  Not Given


Lorazepam (Ativan)  0.5 mg IVP Q6HR PRN; Protocol


   PRN Reason: Agitation


   Stop: 18 11:03


Metronidazole (Flagyl)  500 mg PO BID [facility]


   Stop: 18 09:01


   Last Admin: 18 09:04 Dose:  Not Given


Miscellaneous (Lovenox Subq Per Pharmacy)  1 ea MC PRN PRN


   PRN Reason: PROTOCOL


   Stop: 18 11:40


Montelukast Sodium (Singulair)  10 mg PO DAILY [facility]


   Stop: 18 08:59


   Last Admin: 18 09:04 Dose:  Not Given


Morphine Sulfate (Morphine)  1 mg IVP Q4HR PRN


   PRN Reason: Moderate Pain


   Stop: 18 21:35


   Last Admin: 18 14:02 Dose:  1 mg


Mupirocin (Bactroban Oint)  1 appl NS BID [facility]


   Stop: 18 09:01


   Last Admin: 18 09:01 Dose:  1 appl


Ondansetron HCl (Zofran)  4 mg IV Q6H PRN


   PRN Reason: Nausea / Vomiting


   Stop: 18 21:34


   Last Admin: 18 16:26 Dose:  4 mg


Pantoprazole Sodium (Protonix)  40 mg PO QDAC [facility]


   Stop: 18 07:29


   Last Admin: 18 06:39 Dose:  Not Given


Prednisone (Deltasone)  5 mg PO DAILY [facility]


   Stop: 18 08:59


   Last Admin: 18 09:04 Dose:  Not Given








General: no acute distress, cachectic


HEENT: atraumatic, normocephalic, PERRLA, EOMI


Neck: supple, no thyromegaly


Cardiovascular: S1S2, regular


Lungs: clear to auscultation bilaterally, clear to percussion


Abdomen: soft, tender, no distended


Extremities: no cyanosis, no clubbing, no edema


Neurological: awake, alert, oriented


Skin: intact





Infectious Disease Assmt/Plan





- Assessment


Assessment: 


1.  Leukocytosis.


2.  Abdominal pain 2/2 Diverticulitis.


3.  UTI


4.  Hypertension.


5.  Hyperlipidemia.


6. Diabetes mellitus type 2.











- Plan


Plan: 





Continue Levaquin and flagyl.





Nutritional Asmnt/Malnutr-PDOC





- Dietary Evaluation


Malnutrition Findings (Please click <Entered> for more info): 








Nutritional Asmnt/Malnutrition                             Start:  18 14:

47


Text:                                                      Status: Complete    

  


Freq:                                                                          

  


Protocol:                                                                      

  


 Document     18 14:47  MARIANAG  (Rec: 18 15:32  MARIANAG  [id number])


 Nutritional Asmnt/Malnutrition


     Patient General Information


      Nutritional Screening                      High Risk


      Diagnosis                                  elevated troponin, hypoxia,


                                                 possible PNA


      Pertinent Medical Hx/Surgical Hx           HTN, CAD, asthma/COPD, PUD/


                                                 GERD, ESRD, dementia


      Subjective Information                     Pt seen on mask at time of


                                                 visit. Pt was NPO for CT


                                                 scheduled today, no PO intake


                                                 information available.


      Current Diet Order/ Nutrition Support      NPO


      Pertinent Medications                      vit C, colace, levaquin,


                                                 synthorid, zofran, nacl 0.9%,


                                                 piperacillin, protonix


      Pertinent Labs                              Na 135, Glucose 190, A1c


                                                 5.1


     Nutritional Hx/Data


      Height                                     1.63 m


      Height (Calculated Centimeters)            162.6


      Current Weight (lbs)                       73.936 kg


      Weight (Calculated Kilograms)              73.9


      Weight (Calculated Grams)                  00237.6


      Ideal Body Weight                          120


      Body Mass Index (BMI)                      27.9


      Weight Status                              Overweight


     GI Symptoms


      GI Symptoms                                None


      Last BM                                    none


      Difficult in:                              None


      Skin Integrity/Comment:                    reddened to coccyx, buttocks


     Estimated Nutritional Goals


      BEE in Kcals:                              Using Current wt


      Calories/Kcals/Kg                          23-27


      Kcals Calculated                           3519-4947


      Protein g/k


      Protein Calculated                         74


      Fluid: ml                                  1702-1998ml (1ml/kcal)


     Nutritional Problem


      1. Problem


       Problem                                   altered nutrition related labs


       Etiology                                  hyperglycemia


       Signs/Symptoms:                           glucose 190 at admission


     Malnutrition Alert


      Is there a minimum of two criteria         No


       selected?                                 


       Query Text:Check all the applicable       


       criteria. A minimum of two criteria are   


       recommended for diagnosis of either       


       severe or non-severe malnutrition.        


     Malnutrition Related to Morbid Obesity


      Malnutrition related to morbid obesity     No


     Intervention/Recommendation


      Comments                                   1. Resume pureed JOJO diet


                                                 after CT exam. Assist pt with


                                                 meals. If glucose continue


                                                 high, will consider adding


                                                 CCHO diet.


                                                 2. Monitor PO intake, wt, labs


                                                 and skin integrity


                                                 3. F/U as high risk in 2-3


                                                 days, -


     Expected Outcomes/Goals


      Expected Outcomes/Goals                    1. PO intake to meet at least


                                                 75% of nutritional needs.


                                                 2. Wt stability, skin to


                                                 remain intact, labs to


                                                 approach WNL.

## 2018-07-01 LAB
ALBUMIN SERPL-MCNC: 2.5 GM/DL (ref 3.7–5.3)
ALBUMIN/GLOB SERPL: 0.9 {RATIO} (ref 1–1.8)
ALP SERPL-CCNC: 75 U/L (ref 34–104)
ALT SERPL-CCNC: 936 U/L (ref 7–52)
ANION GAP SERPL CALC-SCNC: 14.8 MMOL/L (ref 7–16)
AST SERPL-CCNC: 1559 U/L (ref 13–39)
BILIRUB SERPL-MCNC: 0.9 MG/DL (ref 0.3–1)
BUN SERPL-MCNC: 40 MG/DL (ref 7–25)
CALCIUM SERPL-MCNC: 7.3 MG/DL (ref 8.6–10.3)
CHLORIDE SERPL-SCNC: 116 MEQ/L (ref 98–107)
CO2 SERPL-SCNC: 15.4 MEQ/L (ref 21–31)
CREAT SERPL-MCNC: 1.8 MG/DL (ref 0.6–1.2)
GLOBULIN SER-MCNC: 2.9 GM/DL
GLUCOSE SERPL-MCNC: 239 MG/DL (ref 70–105)
PHOSPHATE SERPL-MCNC: 1.6 MG/DL (ref 2.5–5)
POTASSIUM SERPL-SCNC: 4.2 MEQ/L (ref 3.5–5.1)
SODIUM SERPL-SCNC: 142 MEQ/L (ref 136–145)

## 2018-07-01 RX ADMIN — GLYCERIN, ISOLEUCINE, LEUCINE, LYSINE, METHIONINE, PHENYLALANINE, THREONINE, TRYPTOPHAN, VALINE, ALANINE, GLYCINE, ARGININE, HISTIDINE, PROLINE, SERINE, CYSTEINE, SODIUM ACETATE, MAGNESIUM ACETATE, CALCIUM ACETATE, SODIUM CHLORIDE, POTASSIUM CHLORIDE, PHOSPHORIC ACID, AND POTASSIUM METABISULFITE SCH MLS/HR
3; .21; .27; .22; .16; .17; .12; .046; .2; .21; .42; .29; .085; .34; .18; .014; .2; .054; .026; .12; .15; .041 INJECTION INTRAVENOUS at 16:00

## 2018-07-01 RX ADMIN — ENOXAPARIN SODIUM SCH MG: 80 INJECTION SUBCUTANEOUS at 04:00

## 2018-07-01 RX ADMIN — DILTIAZEM HYDROCHLORIDE SCH: 30 TABLET, FILM COATED ORAL at 18:27

## 2018-07-01 RX ADMIN — MORPHINE SULFATE PRN MG: 2 INJECTION, SOLUTION INTRAMUSCULAR; INTRAVENOUS at 05:25

## 2018-07-01 RX ADMIN — LEVOFLOXACIN SCH MLS/HR: 5 INJECTION INTRAVENOUS at 00:46

## 2018-07-01 RX ADMIN — HYDROCODONE BITARTRATE AND ACETAMINOPHEN SCH: 5; 325 TABLET ORAL at 08:29

## 2018-07-01 RX ADMIN — HYDROCODONE BITARTRATE AND ACETAMINOPHEN SCH: 5; 325 TABLET ORAL at 16:14

## 2018-07-01 RX ADMIN — DEXTROSE AND SODIUM CHLORIDE SCH MLS/HR: 5; .9 INJECTION, SOLUTION INTRAVENOUS at 08:26

## 2018-07-01 RX ADMIN — ALBUTEROL SULFATE PRN MG: 2.5 SOLUTION RESPIRATORY (INHALATION) at 19:19

## 2018-07-01 RX ADMIN — HYDROCODONE BITARTRATE AND ACETAMINOPHEN SCH: 5; 325 TABLET ORAL at 22:15

## 2018-07-01 RX ADMIN — DEXTROSE AND SODIUM CHLORIDE SCH MLS/HR: 5; .9 INJECTION, SOLUTION INTRAVENOUS at 16:19

## 2018-07-01 RX ADMIN — HYDROMORPHONE HYDROCHLORIDE PRN MG: 2 INJECTION INTRAMUSCULAR; INTRAVENOUS; SUBCUTANEOUS at 09:45

## 2018-07-01 RX ADMIN — LEVOTHYROXINE SODIUM SCH: 100 TABLET ORAL at 06:51

## 2018-07-01 RX ADMIN — HYDROCODONE BITARTRATE AND ACETAMINOPHEN SCH: 5; 325 TABLET ORAL at 03:30

## 2018-07-01 RX ADMIN — DILTIAZEM HYDROCHLORIDE SCH: 30 TABLET, FILM COATED ORAL at 06:24

## 2018-07-01 RX ADMIN — MORPHINE SULFATE PRN MG: 2 INJECTION, SOLUTION INTRAMUSCULAR; INTRAVENOUS at 01:28

## 2018-07-01 RX ADMIN — DILTIAZEM HYDROCHLORIDE SCH: 30 TABLET, FILM COATED ORAL at 13:06

## 2018-07-01 RX ADMIN — METRONIDAZOLE SCH MLS/HR: 500 INJECTION, SOLUTION INTRAVENOUS at 09:50

## 2018-07-01 RX ADMIN — INSULIN ASPART SCH UNITS: 100 INJECTION, SOLUTION INTRAVENOUS; SUBCUTANEOUS at 18:35

## 2018-07-01 RX ADMIN — HYDROMORPHONE HYDROCHLORIDE PRN MG: 2 INJECTION INTRAMUSCULAR; INTRAVENOUS; SUBCUTANEOUS at 23:10

## 2018-07-01 RX ADMIN — DILTIAZEM HYDROCHLORIDE SCH: 30 TABLET, FILM COATED ORAL at 00:48

## 2018-07-01 RX ADMIN — METRONIDAZOLE SCH MLS/HR: 500 INJECTION, SOLUTION INTRAVENOUS at 20:46

## 2018-07-01 RX ADMIN — DEXTROSE AND SODIUM CHLORIDE SCH MLS/HR: 5; .9 INJECTION, SOLUTION INTRAVENOUS at 17:00

## 2018-07-01 RX ADMIN — DEXTROSE AND SODIUM CHLORIDE SCH MLS/HR: 5; .9 INJECTION, SOLUTION INTRAVENOUS at 00:35

## 2018-07-01 RX ADMIN — ENOXAPARIN SODIUM SCH: 80 INJECTION SUBCUTANEOUS at 08:26

## 2018-07-01 NOTE — GENERAL PROGRESS NOTE
Subjective





- Review of Systems


Service Date: 18


Events since last encounter: 





patient c/o abd pain 


seems confused


no fever 


increse triponin


sob





Objective





- Results


Result Diagrams: 


 18 09:59





 18 05:00


Recent Labs: 


 Laboratory Last Values











WBC  10.8 Th/cmm (4.8-10.8)   18  09:59    


 


RBC  4.07 Mil/cmm (3.80-5.20)   18  09:59    


 


Hgb  12.6 gm/dL (12-16)   18  09:59    


 


Hct  37.3 % (41.0-60)  L  18  09:59    


 


MCV  91.9 fl ()   18  09:59    


 


MCH  31.1 pg (27.0-31.0)  H  18  09:59    


 


MCHC Differential  33.8 pg (28.0-36.0)   18  09:59    


 


RDW  14.3 % (11.5-20.0)   18  09:59    


 


Plt Count  199 Th/cmm (150-400)   18  09:59    


 


MPV  7.7 fl  18  09:59    


 


Neutrophils %  78.3 % (40.0-80.0)   18  09:59    


 


Lymphocytes %  15.6 % (20.0-50.0)  L  18  09:59    


 


Monocytes %  5.9 % (2.0-10.0)   18  09:59    


 


Eosinophils %  0.1 % (0.0-5.0)   18  09:59    


 


Basophils %  0.1 % (0.0-2.0)   18  09:59    


 


PT  15.5 SECONDS (9.5-11.5)  H  18  11:49    


 


INR  1.46  (0.5-1.4)  H  18  11:49    


 


PTT (Actin FS)  34.3 SECONDS (26.0-38.0)   18  11:49    


 


Specimen Source  arterial   18  18:58    


 


Sample Site  rr   18  18:58    


 


pH  7.38  (7.35-7.45)   18  18:58    


 


pCO2  41.0 mmHg (35.0-45.0)   18  18:58    


 


pO2  44.0 mmHg (80.0-100.0)  L*  18  18:58    


 


HCO3  23.9 mEq/L (20.0-26.0)   18  18:58    


 


Base Excess  -0.8 mEq/L (-3.0-3.0)   18  18:58    


 


O2 Saturation  79.0 % (92.0-100.0)  L  18  18:58    


 


Magdaleno Test  y   18  18:58    


 


Vent Rate  N/A   18  18:58    


 


Inspired O2  36   18  18:58    


 


Tidal Volume  N/A   18  18:58    


 


PEEP  N/A   18  18:58    


 


Pressure (ins/psv/peep)  N/A   18  18:58    


 


Critical Value  MM/JY   18  18:58    


 


Sodium  142 mEq/L (136-145)   18  05:00    


 


Potassium  4.2 mEq/L (3.5-5.1)   18  05:00    


 


Chloride  116 mEq/L ()  H  18  05:00    


 


Carbon Dioxide  15.4 mEq/L (21.0-31.0)  L  18  05:00    


 


Anion Gap  14.8  (7.0-16.0)   18  05:00    


 


BUN  40 mg/dL (7-25)  H  18  05:00    


 


Creatinine  1.8 mg/dL (0.6-1.2)  H  18  05:00    


 


Est GFR ( Amer)  TNP   18  05:00    


 


Est GFR (Non-Af Amer)  TNP   18  05:00    


 


BUN/Creatinine Ratio  22.2   18  05:00    


 


Glucose  239 mg/dL ()  H D 18  05:00    


 


Hemoglobin A1c %  5.1 % (4.0-6.0)   18  18:40    


 


Calcium  7.3 mg/dL (8.6-10.3)  L  18  05:00    


 


Phosphorus  1.6 mg/dL (2.5-5.0)  L  18  05:00    


 


Total Bilirubin  0.9 mg/dL (0.3-1.0)   18  05:00    


 


AST  1559 U/L (13-39)  H  18  05:00    


 


ALT  936 U/L (7-52)  H  18  05:00    


 


Alkaline Phosphatase  75 U/L ()   18  05:00    


 


Creatine Kinase  21 U/L ()  L  18  18:40    


 


Troponin I  5.84 ng/mL (0.01-0.05)  H* D 18  05:00    


 


B-Natriuretic Peptide  163.0 pg/mL (5.0-100.0)  H  18  18:40    


 


Total Protein  5.4 gm/dL (6.0-8.3)  L  18  05:00    


 


Albumin  2.5 gm/dL (3.7-5.3)  L  18  05:00    


 


Globulin  2.9 gm/dL  18  05:00    


 


Albumin/Globulin Ratio  0.9  (1.0-1.8)  L  18  05:00    


 


Triglycerides  149 mg/dL (<150)   18  12:37    


 


Cholesterol  84 mg/dL (<200)   18  12:37    


 


LDL Cholesterol Direct  47 mg/dL ()  L  18  12:37    


 


HDL Cholesterol  18 mg/dL (23-92)  L  18  12:37    


 


Amylase  12 U/L ()  L  18  18:40    


 


Lipase  6 U/L (11-82)  L  18  18:40    


 


TSH  1.72 uIU/ml (0.34-5.60)   18  09:59    


 


Urine Source  CATH   18  05:05    


 


Urine Color  YELLOW   18  05:05    


 


Urine Clarity  TURBID  (CLEAR)  H  18  05:05    


 


Urine pH  5.5  (4.6 - 8.0)   18  05:05    


 


Ur Specific Gravity  1.020  (1.005-1.030)   18  05:05    


 


Urine Protein  TRACE mg/dL (NEGATIVE)   18  05:05    


 


Urine Glucose (UA)  NEGATIVE mg/dL (NEGATIVE)   18  05:05    


 


Urine Ketones  15 mg/dL (NEGATIVE)  H  18  05:05    


 


Urine Blood  NEGATIVE  (NEGATIVE)   18  05:05    


 


Urine Nitrate  POSITIVE  (NEGATIVE)  H  18  05:05    


 


Urine Bilirubin  NEGATIVE  (NEGATIVE)   18  05:05    


 


Urine Urobilinogen  1.0 E.U./dL (0.2 - 1.0)   18  05:05    


 


Ur Leukocyte Esterase  MODERATE  (NEGATIVE)  H  18  05:05    


 


Urine RBC  0-2 /hpf (0-5)   18  05:05    


 


Urine WBC   /hpf (0-5)  H  18  05:05    


 


Ur Epithelial Cells  MODERATE /lpf (FEW)   18  05:05    


 


Urine Bacteria  4+ /hpf (NONE SEEN)  H  18  05:05    














- Physical Exam


Vitals and I&O: 


 Vital Signs











Temp  97.2 F   18 08:32


 


Pulse  102   18 08:32


 


Resp  25   18 08:32


 


BP  100/57   18 08:32


 


Pulse Ox  99   18 08:32








 Intake & Output











 18





 18:59 06:59 18:59


 


Intake Total 0 1100 1070.25


 


Balance 0 1100 1070.25


 


Weight (lbs) 79.407 kg 78.471 kg 


 


Intake:   


 


  Intake, IV Amount  1100 1070.25


 


    Amikacin 375 mg In   101.5





    Dextrose 5% 100 ml @ 100   





    mls/hr IV Q48HR [facility] Rx#:   





    [id number]   


 


    D5-0.9%Ns 1,000 ml @ 125  1000 968.75





    mls/hr IV .Q8H [facility] Rx#:   





    [id number]   


 


    metroNIDAZOLE 500mg/NS  100 





    100mL 500 mg In 100 ml @   





    100 mls/hr IV Q12HR [facility]   





    Rx#:[id number]   


 


  Oral 0  


 


Other:   


 


  # Voids 3 3 


 


  # Bowel Movements 0 0 


 


  Weight Source Bedscale Bedscale 











Active Medications: 


Current Medications





Acetaminophen (Tylenol)  650 mg PO Q6H PRN


   PRN Reason: Pain (Mild)


   Stop: 08/27/18 02:01


Acetaminophen/Hydrocodone Bitart (Norco 5mg/325mg)  1 tab PO Q6H [facility]


   Stop: 18 21:29


   Last Admin: 18 08:29 Dose:  Not Given


Albuterol Sulfate (Albuterol 2.5mg/3ml Neb Ud)  1.25 mg HHN Q2HRT PRN


   PRN Reason: Shortness of Breath


   Stop: 18 02:12


   Last Admin: 18 07:14 Dose:  1.25 mg


Ascorbic Acid (Vitamin C)  500 mg PO DAILY [facility]


   Stop: 18 08:59


   Last Admin: 18 08:27 Dose:  Not Given


Atorvastatin Calcium (Lipitor)  10 mg PO DAILY [facility]; Protocol


   Stop: 18 08:59


   Last Admin: 18 08:27 Dose:  Not Given


Carvedilol (Coreg)  6.25 mg PO BID [facility]


   Stop: 18 16:59


   Last Admin: 18 08:28 Dose:  Not Given


Clopidogrel Bisulfate (Plavix)  75 mg PO DAILY [facility]


   Stop: 18 08:59


   Last Admin: 18 08:28 Dose:  Not Given


Diltiazem HCl (Cardizem)  60 mg PO Q6HR [facility]


   Stop: 18 17:59


   Last Admin: 18 06:24 Dose:  Not Given


Diltiazem HCl (Cardizem)  5 mg IVP Q6H PRN


   PRN Reason: HR more than 125bpm


   Stop: 18 03:29


Docusate Sodium (Colace)  100 mg PO BID [facility]


   Stop: 18 08:59


   Last Admin: 18 08:28 Dose:  Not Given


Donepezil HCl (Aricept)  10 mg PO HS [facility]


   Stop: 18 21:59


   Last Admin: 18 21:00 Dose:  Not Given


Enoxaparin Sodium (Lovenox)  80 mg SUBQ Q24H [facility]


   Stop: 18 03:59


   Last Admin: 18 04:00 Dose:  80 mg


Enoxaparin Sodium (Lovenox)  80 mg SUBQ DAILY [facility]


   Stop: 18 08:59


   Last Admin: 18 08:26 Dose:  Not Given


Gabapentin (Neurontin)  600 mg PO Q6HR [facility]


   Stop: 18 05:59


   Last Admin: 18 06:24 Dose:  Not Given


Hydromorphone HCl (Dilaudid)  1 mg IVP Q6H PRN


   PRN Reason: Pain (Moderate)


   Stop: 18 16:38


   Last Admin: 18 17:05 Dose:  1 mg


Hydromorphone HCl (Dilaudid)  2 mg IVP Q6H PRN


   PRN Reason: SEVERE PAIN


   Stop: 18 21:10


   Last Admin: 18 09:45 Dose:  2 mg


Levofloxacin (Levaquin Pb)  250 mg in 50 mls @ 50 mls/hr IV Q24HR [facility]


   Stop: 18 00:59


   Last Admin: 18 00:46 Dose:  50 mls/hr


Metronidazole (Flagyl)  500 mg in 100 mls @ 100 mls/hr IV Q12HR [facility]


   Stop: 18 20:59


   Last Admin: 18 09:50 Dose:  100 mls/hr


Dextrose/Sodium Chloride (D5-0.9%Ns)  1,000 mls @ 125 mls/hr IV .Q8H [facility]


   Stop: 18 16:14


   Last Admin: 18 08:26 Dose:  125 mls/hr


Amikacin Sulfate 375 mg/ (Dextrose)  101.5 mls @ 100 mls/hr IV Q48HR [facility]


   Stop: 18 08:59


   Last Infusion: 18 09:45 Dose:  Infused


Levothyroxine Sodium (Synthroid)  0.025 mg PO DAILY@0730 [facility]


   Stop: 18 07:29


   Last Admin: 18 06:51 Dose:  Not Given


Lorazepam (Ativan)  0.5 mg IVP Q6HR PRN; Protocol


   PRN Reason: Agitation


   Stop: 18 11:03


   Last Admin: 18 07:45 Dose:  0.5 mg


Miscellaneous (Amikacin Iv Per Pharmacy)  1 ea  PRN PRN


   PRN Reason: Protocol


   Stop: 18 15:52


Miscellaneous (Lovenox Subq Per Pharmacy)  1 ea  PRN PRN


   PRN Reason: PROTOCOL


   Stop: 18 11:40


Miscellaneous (Tpn Per Pharmacy)  1 ea MC PRN PRN


   PRN Reason: PROTOCOL


   Stop: 18 10:04


Montelukast Sodium (Singulair)  10 mg PO DAILY [facility]


   Stop: 18 08:59


   Last Admin: 18 08:28 Dose:  Not Given


Morphine Sulfate (Morphine)  1 mg IVP Q4HR PRN


   PRN Reason: Moderate Pain


   Stop: 18 21:35


   Last Admin: 18 05:25 Dose:  1 mg


Mupirocin (Bactroban Oint)  1 appl NS BID [facility]


   Stop: 18 09:01


   Last Admin: 18 08:25 Dose:  1 appl


Ondansetron HCl (Zofran)  4 mg IV Q6H PRN


   PRN Reason: Nausea / Vomiting


   Stop: 18 21:34


   Last Admin: 18 16:26 Dose:  4 mg


Pantoprazole Sodium (Protonix)  40 mg IVP DAILY [facility]


   Stop: 18 08:59


   Last Admin: 18 08:25 Dose:  40 mg


Prednisone (Deltasone)  5 mg PO DAILY [facility]


   Stop: 18 08:59


   Last Admin: 18 08:29 Dose:  Not Given








General: Mild distress (confused)


Neck: Supple


Cardiovascular: Regular rate


Lungs: Clear to auscultation


Abdomen: Bowel sounds, Soft, Tender (LLQ), Distended (MILD)





Assessment/Plan





- Assessment


Assessment: 





abdominal pain


gastroenteritis


cad


copd 


pud


gerd


dementia








- Plan


Plan: 





iv vanco as per id


cbc/bmp in am


continue the rest of the orders 





Nutritional Asmnt/Malnutr-PDOC





- Dietary Evaluation


Malnutrition Findings (Please click <Entered> for more info): 








Nutritional Asmnt/Malnutrition                             Start:  18 14:

47


Text:                                                      Status: Complete    

  


Freq:                                                                          

  


Protocol:                                                                      

  


 Document     18 14:47  DIMITRIOS  (Rec: 18 15:32  DIMITRIOS  [id number])


 Nutritional Asmnt/Malnutrition


     Patient General Information


      Nutritional Screening                      High Risk


      Diagnosis                                  elevated troponin, hypoxia,


                                                 possible PNA


      Pertinent Medical Hx/Surgical Hx           HTN, CAD, asthma/COPD, PUD/


                                                 GERD, ESRD, dementia


      Subjective Information                     Pt seen on mask at time of


                                                 visit. Pt was NPO for CT


                                                 scheduled today, no PO intake


                                                 information available.


      Current Diet Order/ Nutrition Support      NPO


      Pertinent Medications                      vit C, colace, levaquin,


                                                 synthorid, zofran, nacl 0.9%,


                                                 piperacillin, protonix


      Pertinent Labs                              Na 135, Glucose 190, A1c


                                                 5.1


     Nutritional Hx/Data


      Height                                     1.63 m


      Height (Calculated Centimeters)            162.6


      Current Weight (lbs)                       73.936 kg


      Weight (Calculated Kilograms)              73.9


      Weight (Calculated Grams)                  38505.6


      Ideal Body Weight                          120


      Body Mass Index (BMI)                      27.9


      Weight Status                              Overweight


     GI Symptoms


      GI Symptoms                                None


      Last BM                                    none


      Difficult in:                              None


      Skin Integrity/Comment:                    reddened to coccyx, buttocks


     Estimated Nutritional Goals


      BEE in Kcals:                              Using Current wt


      Calories/Kcals/Kg                          23-27


      Kcals Calculated                           7568-2043


      Protein g/k


      Protein Calculated                         74


      Fluid: ml                                  1702-1998ml (1ml/kcal)


     Nutritional Problem


      1. Problem


       Problem                                   altered nutrition related labs


       Etiology                                  hyperglycemia


       Signs/Symptoms:                           glucose 190 at admission


     Malnutrition Alert


      Is there a minimum of two criteria         No


       selected?                                 


       Query Text:Check all the applicable       


       criteria. A minimum of two criteria are   


       recommended for diagnosis of either       


       severe or non-severe malnutrition.        


     Malnutrition Related to Morbid Obesity


      Malnutrition related to morbid obesity     No


     Intervention/Recommendation


      Comments                                   1. Resume pureed JOJO diet


                                                 after CT exam. Assist pt with


                                                 meals. If glucose continue


                                                 high, will consider adding


                                                 CCHO diet.


                                                 2. Monitor PO intake, wt, labs


                                                 and skin integrity


                                                 3. F/U as high risk in 2-3


                                                 days, -


     Expected Outcomes/Goals


      Expected Outcomes/Goals                    1. PO intake to meet at least


                                                 75% of nutritional needs.


                                                 2. Wt stability, skin to


                                                 remain intact, labs to


                                                 approach WNL.

## 2018-07-01 NOTE — PROGRESS NOTES
DATE:  06/30/2018



SUBJECTIVE:  The patient was seen in her room.  The patient is asleep but easily

arousable.  The patient is a poor historian.  According to nurses, the patient

has episodes of screaming.  Otherwise, the patient is in no acute distress.



OBJECTIVE:

VITAL SIGNS:  Temperature 97.9, heart rate of 95, blood pressure 100/56,

respirations of 18, and 100% on room air.

HEENT:  Head is atraumatic and normocephalic.  Eyes:  Bilateral conjunctivae are

clear.  Bilateral pupils are equally round and reactive.

NECK:  Supple.  No JVD.

CARDIOVASCULAR:  S1 and S2, without murmur.

PULMONARY:  Clear to auscultation.

GASTROINTESTINAL:  Soft and nontender without guarding.  Positive bowel sounds.

MUSCULOSKELETAL:  No clubbing, no cyanosis noted.



ASSESSMENT:

1.  Urinary tract infection.

2.  Hypertension.

3.  Hyperlipidemia.

4.  Diabetes.

5.  Diverticulitis.

6.  Dementia.

7.  Coronary artery disease.



PLAN:  The patient is currently n.p.o. and pending for swallow evaluation.  We

will provide anxiolytic as needed via IV.  Treatment plans were discussed with

the patient's nurse.  Treatment plans were discussed with Dr. Germain.





DD: 06/30/2018 11:04

DT: 07/01/2018 05:10

JOB# [id number]  [id number]

## 2018-07-01 NOTE — GI PROGRESS NOTE
Subjective





- Review of Systems


Service Date: 07/01/18


Subjective: 





GI NOTE





CONFUSED


C/O ABD PAIN.


NPO.


IN ICU FOR ELEVATED TROPONINS.





Objective





- Results


Result Diagrams: 


 06/30/18 09:59





 07/01/18 05:00


Recent Labs: 


 Laboratory Last Values











WBC  10.8 Th/cmm (4.8-10.8)   06/30/18  09:59    


 


RBC  4.07 Mil/cmm (3.80-5.20)   06/30/18  09:59    


 


Hgb  12.6 gm/dL (12-16)   06/30/18  09:59    


 


Hct  37.3 % (41.0-60)  L  06/30/18  09:59    


 


MCV  91.9 fl ()   06/30/18  09:59    


 


MCH  31.1 pg (27.0-31.0)  H  06/30/18  09:59    


 


MCHC Differential  33.8 pg (28.0-36.0)   06/30/18  09:59    


 


RDW  14.3 % (11.5-20.0)   06/30/18  09:59    


 


Plt Count  199 Th/cmm (150-400)   06/30/18  09:59    


 


MPV  7.7 fl  06/30/18  09:59    


 


Neutrophils %  78.3 % (40.0-80.0)   06/30/18  09:59    


 


Lymphocytes %  15.6 % (20.0-50.0)  L  06/30/18  09:59    


 


Monocytes %  5.9 % (2.0-10.0)   06/30/18  09:59    


 


Eosinophils %  0.1 % (0.0-5.0)   06/30/18  09:59    


 


Basophils %  0.1 % (0.0-2.0)   06/30/18  09:59    


 


PT  15.5 SECONDS (9.5-11.5)  H  06/30/18  11:49    


 


INR  1.46  (0.5-1.4)  H  06/30/18  11:49    


 


PTT (Actin FS)  34.3 SECONDS (26.0-38.0)   06/30/18  11:49    


 


Specimen Source  arterial   06/27/18  18:58    


 


Sample Site  rr   06/27/18  18:58    


 


pH  7.38  (7.35-7.45)   06/27/18  18:58    


 


pCO2  41.0 mmHg (35.0-45.0)   06/27/18  18:58    


 


pO2  44.0 mmHg (80.0-100.0)  L*  06/27/18  18:58    


 


HCO3  23.9 mEq/L (20.0-26.0)   06/27/18  18:58    


 


Base Excess  -0.8 mEq/L (-3.0-3.0)   06/27/18  18:58    


 


O2 Saturation  79.0 % (92.0-100.0)  L  06/27/18  18:58    


 


Magdaleno Test  y   06/27/18  18:58    


 


Vent Rate  N/A   06/27/18  18:58    


 


Inspired O2  36   06/27/18  18:58    


 


Tidal Volume  N/A   06/27/18  18:58    


 


PEEP  N/A   06/27/18  18:58    


 


Pressure (ins/psv/peep)  N/A   06/27/18  18:58    


 


Critical Value  MM/JY   06/27/18  18:58    


 


Sodium  142 mEq/L (136-145)   07/01/18  05:00    


 


Potassium  4.2 mEq/L (3.5-5.1)   07/01/18  05:00    


 


Chloride  116 mEq/L ()  H  07/01/18  05:00    


 


Carbon Dioxide  15.4 mEq/L (21.0-31.0)  L  07/01/18  05:00    


 


Anion Gap  14.8  (7.0-16.0)   07/01/18  05:00    


 


BUN  40 mg/dL (7-25)  H  07/01/18  05:00    


 


Creatinine  1.8 mg/dL (0.6-1.2)  H  07/01/18  05:00    


 


Est GFR ( Amer)  TNP   07/01/18  05:00    


 


Est GFR (Non-Af Amer)  TNP   07/01/18  05:00    


 


BUN/Creatinine Ratio  22.2   07/01/18  05:00    


 


Glucose  239 mg/dL ()  H D 07/01/18  05:00    


 


Hemoglobin A1c %  5.1 % (4.0-6.0)   06/27/18  18:40    


 


Calcium  7.3 mg/dL (8.6-10.3)  L  07/01/18  05:00    


 


Phosphorus  1.6 mg/dL (2.5-5.0)  L  07/01/18  05:00    


 


Total Bilirubin  0.9 mg/dL (0.3-1.0)   07/01/18  05:00    


 


AST  1559 U/L (13-39)  H  07/01/18  05:00    


 


ALT  936 U/L (7-52)  H  07/01/18  05:00    


 


Alkaline Phosphatase  75 U/L ()   07/01/18  05:00    


 


Creatine Kinase  21 U/L ()  L  06/27/18  18:40    


 


Troponin I  5.84 ng/mL (0.01-0.05)  H* D 07/01/18  05:00    


 


B-Natriuretic Peptide  163.0 pg/mL (5.0-100.0)  H  06/27/18  18:40    


 


Total Protein  5.4 gm/dL (6.0-8.3)  L  07/01/18  05:00    


 


Albumin  2.5 gm/dL (3.7-5.3)  L  07/01/18  05:00    


 


Globulin  2.9 gm/dL  07/01/18  05:00    


 


Albumin/Globulin Ratio  0.9  (1.0-1.8)  L  07/01/18  05:00    


 


Triglycerides  149 mg/dL (<150)   06/30/18  12:37    


 


Cholesterol  84 mg/dL (<200)   06/30/18  12:37    


 


LDL Cholesterol Direct  47 mg/dL ()  L  06/30/18  12:37    


 


HDL Cholesterol  18 mg/dL (23-92)  L  06/30/18  12:37    


 


Amylase  12 U/L ()  L  06/27/18  18:40    


 


Lipase  6 U/L (11-82)  L  06/27/18  18:40    


 


TSH  1.72 uIU/ml (0.34-5.60)   06/30/18  09:59    


 


Urine Source  CATH   06/28/18  05:05    


 


Urine Color  YELLOW   06/28/18  05:05    


 


Urine Clarity  TURBID  (CLEAR)  H  06/28/18  05:05    


 


Urine pH  5.5  (4.6 - 8.0)   06/28/18  05:05    


 


Ur Specific Gravity  1.020  (1.005-1.030)   06/28/18  05:05    


 


Urine Protein  TRACE mg/dL (NEGATIVE)   06/28/18  05:05    


 


Urine Glucose (UA)  NEGATIVE mg/dL (NEGATIVE)   06/28/18  05:05    


 


Urine Ketones  15 mg/dL (NEGATIVE)  H  06/28/18  05:05    


 


Urine Blood  NEGATIVE  (NEGATIVE)   06/28/18  05:05    


 


Urine Nitrate  POSITIVE  (NEGATIVE)  H  06/28/18  05:05    


 


Urine Bilirubin  NEGATIVE  (NEGATIVE)   06/28/18  05:05    


 


Urine Urobilinogen  1.0 E.U./dL (0.2 - 1.0)   06/28/18  05:05    


 


Ur Leukocyte Esterase  MODERATE  (NEGATIVE)  H  06/28/18  05:05    


 


Urine RBC  0-2 /hpf (0-5)   06/28/18  05:05    


 


Urine WBC   /hpf (0-5)  H  06/28/18  05:05    


 


Ur Epithelial Cells  MODERATE /lpf (FEW)   06/28/18  05:05    


 


Urine Bacteria  4+ /hpf (NONE SEEN)  H  06/28/18  05:05    














- Physical Exam


Vitals and I&O: 


 Vital Signs











Temp  97.2 F   07/01/18 08:32


 


Pulse  102   07/01/18 08:32


 


Resp  25   07/01/18 08:32


 


BP  100/57   07/01/18 08:32


 


Pulse Ox  99   07/01/18 08:32








 Intake & Output











 06/30/18 07/01/18 07/01/18





 18:59 06:59 18:59


 


Intake Total 0 1100 1070.25


 


Balance 0 1100 1070.25


 


Weight (lbs) 79.407 kg 78.471 kg 


 


Intake:   


 


  Intake, IV Amount  1100 1070.25


 


    Amikacin 375 mg In   101.5





    Dextrose 5% 100 ml @ 100   





    mls/hr IV Q48HR [facility] Rx#:   





    [id number]   


 


    D5-0.9%Ns 1,000 ml @ 125  1000 968.75





    mls/hr IV .Q8H [facility] Rx#:   





    [id number]   


 


    metroNIDAZOLE 500mg/NS  100 





    100mL 500 mg In 100 ml @   





    100 mls/hr IV Q12HR [facility]   





    Rx#:[id number]   


 


  Oral 0  


 


Other:   


 


  # Voids 3 3 


 


  # Bowel Movements 0 0 


 


  Weight Source Bedscale Bedscale 











Active Medications: 


Current Medications





Acetaminophen (Tylenol)  650 mg PO Q6H PRN


   PRN Reason: Pain (Mild)


   Stop: 08/27/18 02:01


Acetaminophen/Hydrocodone Bitart (Norco 5mg/325mg)  1 tab PO Q6H [facility]


   Stop: 08/26/18 21:29


   Last Admin: 07/01/18 08:29 Dose:  Not Given


Albuterol Sulfate (Albuterol 2.5mg/3ml Neb Ud)  1.25 mg HHN Q2HRT PRN


   PRN Reason: Shortness of Breath


   Stop: 08/27/18 02:12


   Last Admin: 06/28/18 07:14 Dose:  1.25 mg


Ascorbic Acid (Vitamin C)  500 mg PO DAILY [facility]


   Stop: 08/27/18 08:59


   Last Admin: 07/01/18 08:27 Dose:  Not Given


Atorvastatin Calcium (Lipitor)  10 mg PO DAILY [facility]; Protocol


   Stop: 08/30/18 08:59


   Last Admin: 07/01/18 08:27 Dose:  Not Given


Carvedilol (Coreg)  6.25 mg PO BID [facility]


   Stop: 08/28/18 16:59


   Last Admin: 07/01/18 08:28 Dose:  Not Given


Clopidogrel Bisulfate (Plavix)  75 mg PO DAILY [facility]


   Stop: 08/27/18 08:59


   Last Admin: 07/01/18 08:28 Dose:  Not Given


Diltiazem HCl (Cardizem)  60 mg PO Q6HR [facility]


   Stop: 08/28/18 17:59


   Last Admin: 07/01/18 06:24 Dose:  Not Given


Diltiazem HCl (Cardizem)  5 mg IVP Q6H PRN


   PRN Reason: HR more than 125bpm


   Stop: 08/30/18 03:29


Docusate Sodium (Colace)  100 mg PO BID [facility]


   Stop: 08/27/18 08:59


   Last Admin: 07/01/18 08:28 Dose:  Not Given


Donepezil HCl (Aricept)  10 mg PO HS [facility]


   Stop: 08/26/18 21:59


   Last Admin: 06/30/18 21:00 Dose:  Not Given


Enoxaparin Sodium (Lovenox)  80 mg SUBQ Q24H [facility]


   Stop: 08/30/18 03:59


   Last Admin: 07/01/18 04:00 Dose:  80 mg


Enoxaparin Sodium (Lovenox)  80 mg SUBQ DAILY [facility]


   Stop: 08/30/18 08:59


   Last Admin: 07/01/18 08:26 Dose:  Not Given


Gabapentin (Neurontin)  600 mg PO Q6HR [facility]


   Stop: 08/27/18 05:59


   Last Admin: 07/01/18 06:24 Dose:  Not Given


Hydromorphone HCl (Dilaudid)  1 mg IVP Q6H PRN


   PRN Reason: Pain (Moderate)


   Stop: 08/28/18 16:38


   Last Admin: 06/29/18 17:05 Dose:  1 mg


Hydromorphone HCl (Dilaudid)  2 mg IVP Q6H PRN


   PRN Reason: SEVERE PAIN


   Stop: 08/28/18 21:10


   Last Admin: 07/01/18 09:45 Dose:  2 mg


Levofloxacin (Levaquin Pb)  250 mg in 50 mls @ 50 mls/hr IV Q24HR [facility]


   Stop: 08/27/18 00:59


   Last Admin: 07/01/18 00:46 Dose:  50 mls/hr


Metronidazole (Flagyl)  500 mg in 100 mls @ 100 mls/hr IV Q12HR [facility]


   Stop: 08/29/18 20:59


   Last Admin: 07/01/18 09:50 Dose:  100 mls/hr


Dextrose/Sodium Chloride (D5-0.9%Ns)  1,000 mls @ 125 mls/hr IV .Q8H [facility]


   Stop: 08/29/18 16:14


   Last Admin: 07/01/18 08:26 Dose:  125 mls/hr


Amikacin Sulfate 375 mg/ (Dextrose)  101.5 mls @ 100 mls/hr IV Q48HR [facility]


   Stop: 08/30/18 08:59


   Last Infusion: 07/01/18 09:45 Dose:  Infused


Levothyroxine Sodium (Synthroid)  0.025 mg PO DAILY@0730 [facility]


   Stop: 08/27/18 07:29


   Last Admin: 07/01/18 06:51 Dose:  Not Given


Lorazepam (Ativan)  0.5 mg IVP Q6HR [facility]; Protocol


   PRN Reason: Agitation


   Stop: 08/29/18 11:03


   Last Admin: 07/01/18 07:45 Dose:  0.5 mg


Miscellaneous (Amikacin Iv Per Pharmacy)  1 ea  PRN PRN


   PRN Reason: Protocol


   Stop: 08/29/18 15:52


Miscellaneous (Lovenox Subq Per Pharmacy)  1 ea  PRN PRN


   PRN Reason: PROTOCOL


   Stop: 08/29/18 11:40


Montelukast Sodium (Singulair)  10 mg PO DAILY [facility]


   Stop: 08/27/18 08:59


   Last Admin: 07/01/18 08:28 Dose:  Not Given


Morphine Sulfate (Morphine)  1 mg IVP Q4HR PRN


   PRN Reason: Moderate Pain


   Stop: 08/26/18 21:35


   Last Admin: 07/01/18 05:25 Dose:  1 mg


Mupirocin (Bactroban Oint)  1 appl NS BID [facility]


   Stop: 07/04/18 09:01


   Last Admin: 07/01/18 08:25 Dose:  1 appl


Ondansetron HCl (Zofran)  4 mg IV Q6H PRN


   PRN Reason: Nausea / Vomiting


   Stop: 08/26/18 21:34


   Last Admin: 06/29/18 16:26 Dose:  4 mg


Pantoprazole Sodium (Protonix)  40 mg IVP DAILY [facility]


   Stop: 08/30/18 08:59


   Last Admin: 07/01/18 08:25 Dose:  40 mg


Prednisone (Deltasone)  5 mg PO DAILY [facility]


   Stop: 08/27/18 08:59


   Last Admin: 07/01/18 08:29 Dose:  Not Given








General: Mild distress (confused)


Neck: Supple


Cardiovascular: Regular rate


Lungs: Clear to auscultation


Abdomen: Bowel sounds, Soft, Tender (LLQ), Distended (MILD)





Assessment/Plan





- Assessment


Assessment: 





1. ABD PAIN WITH CT SHOWED SIGMOID DIVERTICULITIS.  MAY ALSO HAVE ISCHEMIC 

COLITIS.


2. HIATAL HERNIA.


3. CHOLELITHIASIS, LESS LIKELY ACUTE CHOLECYSTITIS.


4. CHEST PAIN WITH ELEVATED TROPONIN - LIKELY ACS/NSTEMI.  EF 25%.


5. ELEVATED TRANSAMINASES - R/O ISCHEMIC HEPATITIS VS. CONGESTION FROM CHF.





- Plan


Plan: 





1. ABX.


2. NPO.


3. PAIN CONTROL MEASURES.


4. IVF.  ADD TPN.


5. PER CARDS TO OPTIMIZE CARDIAC FUNCTION.


6. FAILED SWALLOW EVAL.  POLST OUTLINED WISH FOR DNR AND NO ARTIFICIAL FEEDING.


7. CHECK LIVER LABS AND ABD US.

## 2018-07-02 LAB
ALBUMIN SERPL-MCNC: 2.4 GM/DL (ref 3.7–5.3)
ALBUMIN/GLOB SERPL: 0.8 {RATIO} (ref 1–1.8)
ALP SERPL-CCNC: 70 U/L (ref 34–104)
ALT SERPL-CCNC: 790 U/L (ref 7–52)
ANION GAP SERPL CALC-SCNC: 11 MMOL/L (ref 7–16)
APAP SERPL-MCNC: < 10 UG/ML (ref 10–30)
AST SERPL-CCNC: 757 U/L (ref 13–39)
BILIRUB DIRECT SERPL-MCNC: 0.24 MG/DL (ref 0–0.2)
BILIRUB SERPL-MCNC: 0.6 MG/DL (ref 0.3–1)
BUN SERPL-MCNC: 41 MG/DL (ref 7–25)
CALCIUM SERPL-MCNC: 7.3 MG/DL (ref 8.6–10.3)
CHLORIDE SERPL-SCNC: 118 MEQ/L (ref 98–107)
CO2 SERPL-SCNC: 19.4 MEQ/L (ref 21–31)
CREAT SERPL-MCNC: 2 MG/DL (ref 0.6–1.2)
GLOBULIN SER-MCNC: 2.9 GM/DL
GLUCOSE SERPL-MCNC: 142 MG/DL (ref 70–105)
MAGNESIUM SERPL-MCNC: 1.9 MG/DL (ref 1.9–2.7)
PHOSPHATE SERPL-MCNC: 1.6 MG/DL (ref 2.5–5)
POTASSIUM SERPL-SCNC: 4.4 MEQ/L (ref 3.5–5.1)
SODIUM SERPL-SCNC: 144 MEQ/L (ref 136–145)

## 2018-07-02 RX ADMIN — GLYCERIN, ISOLEUCINE, LEUCINE, LYSINE, METHIONINE, PHENYLALANINE, THREONINE, TRYPTOPHAN, VALINE, ALANINE, GLYCINE, ARGININE, HISTIDINE, PROLINE, SERINE, CYSTEINE, SODIUM ACETATE, MAGNESIUM ACETATE, CALCIUM ACETATE, SODIUM CHLORIDE, POTASSIUM CHLORIDE, PHOSPHORIC ACID, AND POTASSIUM METABISULFITE SCH MLS/HR
3; .21; .27; .22; .16; .17; .12; .046; .2; .21; .42; .29; .085; .34; .18; .014; .2; .054; .026; .12; .15; .041 INJECTION INTRAVENOUS at 09:06

## 2018-07-02 RX ADMIN — HYDROCODONE BITARTRATE AND ACETAMINOPHEN SCH: 5; 325 TABLET ORAL at 10:55

## 2018-07-02 RX ADMIN — DEXTROSE AND SODIUM CHLORIDE SCH MLS/HR: 5; .9 INJECTION, SOLUTION INTRAVENOUS at 13:26

## 2018-07-02 RX ADMIN — HYDROCODONE BITARTRATE AND ACETAMINOPHEN SCH: 5; 325 TABLET ORAL at 15:45

## 2018-07-02 RX ADMIN — DILTIAZEM HYDROCHLORIDE SCH: 30 TABLET, FILM COATED ORAL at 18:10

## 2018-07-02 RX ADMIN — LEVOFLOXACIN SCH MLS/HR: 5 INJECTION INTRAVENOUS at 00:45

## 2018-07-02 RX ADMIN — LEVOTHYROXINE SODIUM SCH: 100 TABLET ORAL at 06:53

## 2018-07-02 RX ADMIN — DILTIAZEM HYDROCHLORIDE SCH: 30 TABLET, FILM COATED ORAL at 05:35

## 2018-07-02 RX ADMIN — ENOXAPARIN SODIUM SCH: 80 INJECTION SUBCUTANEOUS at 08:37

## 2018-07-02 RX ADMIN — ENOXAPARIN SODIUM SCH MG: 80 INJECTION SUBCUTANEOUS at 04:15

## 2018-07-02 RX ADMIN — HYDROMORPHONE HYDROCHLORIDE PRN MG: 2 INJECTION INTRAMUSCULAR; INTRAVENOUS; SUBCUTANEOUS at 05:31

## 2018-07-02 RX ADMIN — MORPHINE SULFATE PRN MG: 2 INJECTION, SOLUTION INTRAMUSCULAR; INTRAVENOUS at 03:48

## 2018-07-02 RX ADMIN — INSULIN ASPART SCH: 100 INJECTION, SOLUTION INTRAVENOUS; SUBCUTANEOUS at 18:14

## 2018-07-02 RX ADMIN — HYDROMORPHONE HYDROCHLORIDE PRN MG: 2 INJECTION INTRAMUSCULAR; INTRAVENOUS; SUBCUTANEOUS at 23:45

## 2018-07-02 RX ADMIN — HYDROCODONE BITARTRATE AND ACETAMINOPHEN SCH: 5; 325 TABLET ORAL at 22:06

## 2018-07-02 RX ADMIN — HYDROMORPHONE HYDROCHLORIDE PRN MG: 1 INJECTION, SOLUTION INTRAMUSCULAR; INTRAVENOUS; SUBCUTANEOUS at 10:59

## 2018-07-02 RX ADMIN — INSULIN ASPART SCH: 100 INJECTION, SOLUTION INTRAVENOUS; SUBCUTANEOUS at 13:59

## 2018-07-02 RX ADMIN — DILTIAZEM HYDROCHLORIDE SCH: 30 TABLET, FILM COATED ORAL at 01:07

## 2018-07-02 RX ADMIN — DEXTROSE AND SODIUM CHLORIDE SCH MLS/HR: 5; .9 INJECTION, SOLUTION INTRAVENOUS at 05:36

## 2018-07-02 RX ADMIN — INSULIN ASPART SCH: 100 INJECTION, SOLUTION INTRAVENOUS; SUBCUTANEOUS at 05:35

## 2018-07-02 RX ADMIN — INSULIN ASPART SCH UNITS: 100 INJECTION, SOLUTION INTRAVENOUS; SUBCUTANEOUS at 00:53

## 2018-07-02 RX ADMIN — DILTIAZEM HYDROCHLORIDE SCH: 30 TABLET, FILM COATED ORAL at 12:09

## 2018-07-02 RX ADMIN — HYDROMORPHONE HYDROCHLORIDE PRN MG: 2 INJECTION INTRAMUSCULAR; INTRAVENOUS; SUBCUTANEOUS at 18:26

## 2018-07-02 RX ADMIN — METRONIDAZOLE SCH MLS/HR: 500 INJECTION, SOLUTION INTRAVENOUS at 20:21

## 2018-07-02 RX ADMIN — METRONIDAZOLE SCH MLS/HR: 500 INJECTION, SOLUTION INTRAVENOUS at 09:05

## 2018-07-02 RX ADMIN — HYDROCODONE BITARTRATE AND ACETAMINOPHEN SCH: 5; 325 TABLET ORAL at 04:17

## 2018-07-02 NOTE — INFECTIOUS DISEASE PROG NOTE
Infectious Disease Subjective





- Review of Systems


Service Date: 18


Subjective: 


Remains in the ICU.


 She c/o abdominal pain in lower abdomen.  No fever.





Infectious Disease Objective





- Results


Result Diagrams: 


 18 09:59





 18 04:30


Recent Labs: 


 Laboratory Last Values











WBC  10.8 Th/cmm (4.8-10.8)   18  09:59    


 


RBC  4.07 Mil/cmm (3.80-5.20)   18  09:59    


 


Hgb  12.6 gm/dL (12-16)   18  09:59    


 


Hct  37.3 % (41.0-60)  L  18  09:59    


 


MCV  91.9 fl ()   18  09:59    


 


MCH  31.1 pg (27.0-31.0)  H  18  09:59    


 


MCHC Differential  33.8 pg (28.0-36.0)   18  09:59    


 


RDW  14.3 % (11.5-20.0)   18  09:59    


 


Plt Count  199 Th/cmm (150-400)   18  09:59    


 


MPV  7.7 fl  18  09:59    


 


Neutrophils %  78.3 % (40.0-80.0)   18  09:59    


 


Lymphocytes %  15.6 % (20.0-50.0)  L  18  09:59    


 


Monocytes %  5.9 % (2.0-10.0)   18  09:59    


 


Eosinophils %  0.1 % (0.0-5.0)   18  09:59    


 


Basophils %  0.1 % (0.0-2.0)   18  09:59    


 


PT  15.5 SECONDS (9.5-11.5)  H  18  11:49    


 


INR  1.46  (0.5-1.4)  H  18  11:49    


 


PTT (Actin FS)  34.3 SECONDS (26.0-38.0)   18  11:49    


 


Specimen Source  arterial   18  18:58    


 


Sample Site  rr   18  18:58    


 


pH  7.38  (7.35-7.45)   18  18:58    


 


pCO2  41.0 mmHg (35.0-45.0)   18  18:58    


 


pO2  44.0 mmHg (80.0-100.0)  L*  18  18:58    


 


HCO3  23.9 mEq/L (20.0-26.0)   18  18:58    


 


Base Excess  -0.8 mEq/L (-3.0-3.0)   18  18:58    


 


O2 Saturation  79.0 % (92.0-100.0)  L  18  18:58    


 


Magdaleno Test  y   18  18:58    


 


Vent Rate  N/A   18  18:58    


 


Inspired O2  36   18  18:58    


 


Tidal Volume  N/A   18  18:58    


 


PEEP  N/A   18  18:58    


 


Pressure (ins/psv/peep)  N/A   18  18:58    


 


Critical Value  MM/JY   18  18:58    


 


Sodium  144 mEq/L (136-145)   18  04:30    


 


Potassium  4.4 mEq/L (3.5-5.1)   18  04:30    


 


Chloride  118 mEq/L ()  H  18  04:30    


 


Carbon Dioxide  19.4 mEq/L (21.0-31.0)  L  18  04:30    


 


Anion Gap  11.0  (7.0-16.0)   18  04:30    


 


BUN  41 mg/dL (7-25)  H  18  04:30    


 


Creatinine  2.0 mg/dL (0.6-1.2)  H  18  04:30    


 


Est GFR ( Amer)  TNP   18  04:30    


 


Est GFR (Non-Af Amer)  TNP   18  04:30    


 


BUN/Creatinine Ratio  20.5   18  04:30    


 


Glucose  142 mg/dL ()  H D 18  04:30    


 


POC Glucose  162 MG/DL (70 - 105)  H  18  18:31    


 


Hemoglobin A1c %  5.1 % (4.0-6.0)   18  18:40    


 


Calcium  7.3 mg/dL (8.6-10.3)  L  18  04:30    


 


Phosphorus  1.6 mg/dL (2.5-5.0)  L  18  04:30    


 


Magnesium  1.9 mg/dL (1.9-2.7)   18  04:30    


 


Total Bilirubin  0.6 mg/dL (0.3-1.0)   18  04:30    


 


Direct Bilirubin  0.24 mg/dL (0.0-0.2)  H  18  04:30    


 


AST  757 U/L (13-39)  H  18  04:30    


 


ALT  790 U/L (7-52)  H  18  04:30    


 


Alkaline Phosphatase  70 U/L ()   18  04:30    


 


Creatine Kinase  21 U/L ()  L  18  18:40    


 


Troponin I  5.84 ng/mL (0.01-0.05)  H* D 18  05:00    


 


B-Natriuretic Peptide  163.0 pg/mL (5.0-100.0)  H  18  18:40    


 


Total Protein  5.3 gm/dL (6.0-8.3)  L  18  04:30    


 


Albumin  2.4 gm/dL (3.7-5.3)  L  18  04:30    


 


Globulin  2.9 gm/dL  18  04:30    


 


Albumin/Globulin Ratio  0.8  (1.0-1.8)  L  18  04:30    


 


Triglycerides  149 mg/dL (<150)   18  12:37    


 


Cholesterol  84 mg/dL (<200)   18  12:37    


 


LDL Cholesterol Direct  47 mg/dL ()  L  18  12:37    


 


HDL Cholesterol  18 mg/dL (23-92)  L  18  12:37    


 


Amylase  12 U/L ()  L  18  18:40    


 


Lipase  6 U/L (11-82)  L  18  18:40    


 


TSH  1.72 uIU/ml (0.34-5.60)   18  09:59    


 


Urine Source  CATH   18  05:05    


 


Urine Color  YELLOW   18  05:05    


 


Urine Clarity  TURBID  (CLEAR)  H  18  05:05    


 


Urine pH  5.5  (4.6 - 8.0)   18  05:05    


 


Ur Specific Gravity  1.020  (1.005-1.030)   18  05:05    


 


Urine Protein  TRACE mg/dL (NEGATIVE)   18  05:05    


 


Urine Glucose (UA)  NEGATIVE mg/dL (NEGATIVE)   18  05:05    


 


Urine Ketones  15 mg/dL (NEGATIVE)  H  18  05:05    


 


Urine Blood  NEGATIVE  (NEGATIVE)   18  05:05    


 


Urine Nitrate  POSITIVE  (NEGATIVE)  H  18  05:05    


 


Urine Bilirubin  NEGATIVE  (NEGATIVE)   18  05:05    


 


Urine Urobilinogen  1.0 E.U./dL (0.2 - 1.0)   18  05:05    


 


Ur Leukocyte Esterase  MODERATE  (NEGATIVE)  H  18  05:05    


 


Urine RBC  0-2 /hpf (0-5)   18  05:05    


 


Urine WBC   /hpf (0-5)  H  18  05:05    


 


Ur Epithelial Cells  MODERATE /lpf (FEW)   18  05:05    


 


Urine Bacteria  4+ /hpf (NONE SEEN)  H  18  05:05    


 


Acetaminophen  < 10.0 ug/mL (10.0-30.0)  L  18  04:30    














- Physical Exam


Vitals and I&O: 


 Vital Signs











Temp  98.5 F   18 05:00


 


Pulse  87   18 07:17


 


Resp  16   18 07:17


 


BP  90/57   18 06:00


 


Pulse Ox  98   18 08:00








 Intake & Output











 18





 18:59 06:59 18:59


 


Intake Total 2155.667 1045 855


 


Balance 2155.667 1045 855


 


Weight (lbs) 79.01 kg 79.832 kg 


 


Intake:   


 


  Intake, IV Amount 2155.667 1045 855


 


    Amikacin 375 mg In 101.5  





    Dextrose 5% 100 ml @ 100   





    mls/hr IV Q48HR [facility] Rx#:   





    [id number]   


 


    Amino Acids 3% /   855





    Electrolytes 1,000 ml @   





    50 mls/hr IV .Q20H [facility] Rx   





    #:[id number]   


 


    D5-0.9%Ns 1,000 ml @ 125 1954.167  





    mls/hr IV .Q8H [facility] Rx#:   





    [id number]   


 


    D5-0.9%Ns 1,000 ml @ 75  945 





    mls/hr IV .[id number] [facility] Rx   





    #:[id number]   


 


    metroNIDAZOLE 500mg/ 100 





    100mL 500 mg In 100 ml @   





    100 mls/hr IV Q12HR [facility]   





    Rx#:[id number]   


 


  Oral 0  


 


Other:   


 


  # Voids 3 3 


 


  # Bowel Movements 0  


 


  Weight Source Bedscale Bedscale 











Active Medications: 


Current Medications





Acetaminophen (Tylenol)  650 mg PO Q6H PRN


   PRN Reason: Pain (Mild)


   Stop: 18 02:01


Acetaminophen/Hydrocodone Bitart (Norco 5mg/325mg)  1 tab PO Q6H [facility]


   Stop: 18 21:29


   Last Admin: 18 04:17 Dose:  Not Given


Albuterol Sulfate (Albuterol 2.5mg/3ml Neb Ud)  1.25 mg HHN Q2HRT PRN


   PRN Reason: Shortness of Breath


   Stop: 18 02:12


   Last Admin: 18 19:19 Dose:  1.25 mg


Ascorbic Acid (Vitamin C)  500 mg PO DAILY [facility]


   Stop: 18 08:59


   Last Admin: 18 08:36 Dose:  Not Given


Atorvastatin Calcium (Lipitor)  10 mg PO DAILY [facility]; Protocol


   Stop: 18 08:59


   Last Admin: 18 08:37 Dose:  Not Given


Carvedilol (Coreg)  6.25 mg PO BID [facility]


   Stop: 18 16:59


   Last Admin: 18 08:37 Dose:  Not Given


Clopidogrel Bisulfate (Plavix)  75 mg PO DAILY [facility]


   Stop: 18 08:59


   Last Admin: 18 08:36 Dose:  Not Given


Diltiazem HCl (Cardizem)  60 mg PO Q6HR [facility]


   Stop: 18 17:59


   Last Admin: 18 05:35 Dose:  Not Given


Diltiazem HCl (Cardizem)  5 mg IVP Q6H PRN


   PRN Reason: HR more than 125bpm


   Stop: 18 03:29


Docusate Sodium (Colace)  100 mg PO BID [facility]


   Stop: 18 08:59


   Last Admin: 18 08:36 Dose:  Not Given


Donepezil HCl (Aricept)  10 mg PO HS [facility]


   Stop: 18 21:59


   Last Admin: 18 22:15 Dose:  Not Given


Enoxaparin Sodium (Lovenox)  80 mg SUBQ Q24H [facility]


   Stop: 18 03:59


   Last Admin: 18 04:15 Dose:  80 mg


Enoxaparin Sodium (Lovenox)  80 mg SUBQ DAILY [facility]


   Stop: 18 08:59


   Last Admin: 18 08:37 Dose:  Not Given


Gabapentin (Neurontin)  600 mg PO Q6HR [facility]


   Stop: 18 05:59


   Last Admin: 18 05:36 Dose:  Not Given


Hydromorphone HCl (Dilaudid)  1 mg IVP Q6H PRN


   PRN Reason: Pain (Moderate)


   Stop: 18 16:38


   Last Admin: 18 17:05 Dose:  1 mg


Hydromorphone HCl (Dilaudid)  2 mg IVP Q6H PRN


   PRN Reason: SEVERE PAIN


   Stop: 18 21:10


   Last Admin: 18 05:31 Dose:  2 mg


Levofloxacin (Levaquin Pb)  250 mg in 50 mls @ 50 mls/hr IV Q24HR [facility]


   Stop: 18 00:59


   Last Admin: 18 00:45 Dose:  50 mls/hr


Metronidazole (Flagyl)  500 mg in 100 mls @ 100 mls/hr IV Q12HR [facility]


   Stop: 18 20:59


   Last Admin: 18 09:05 Dose:  100 mls/hr


Amikacin Sulfate 375 mg/ (Dextrose)  101.5 mls @ 100 mls/hr IV Q48HR [facility]


   Stop: 18 08:59


   Last Infusion: 18 09:45 Dose:  Infused


Amino Acids/Electrolytes (Procalamine)  1,000 mls @ 50 mls/hr IV .Q20H [facility]


   Stop: 18 15:59


   Last Admin: 18 09:06 Dose:  50 mls/hr


Dextrose/Sodium Chloride (D5-0.9%Ns)  1,000 mls @ 75 mls/hr IV .[id number] [facility]


   Stop: 18 16:29


   Last Admin: 18 05:36 Dose:  75 mls/hr


Insulin Aspart (Novolog Insulin Sliding Scale)  0 units SUBQ Q6HR [facility]; Protocol


   Stop: 18 17:59


   Last Admin: 18 05:35 Dose:  Not Given


Levothyroxine Sodium (Synthroid)  0.025 mg PO DAILY@0730 [facility]


   Stop: 18 07:29


   Last Admin: 18 06:53 Dose:  Not Given


Lorazepam (Ativan)  0.5 mg IVP Q6HR [facility]; Protocol


   PRN Reason: Agitation


   Stop: 18 11:03


   Last Admin: 18 07:45 Dose:  0.5 mg


Miscellaneous (Amikacin Iv Per Pharmacy)  1 [facility] PRN PRN


   PRN Reason: Protocol


   Stop: 18 15:52


Miscellaneous (Lovenox Subq Per Pharmacy)  1 [facility] PRN PRN


   PRN Reason: PROTOCOL


   Stop: 18 11:40


Miscellaneous (Ppn Per Pharmacy)  1 [facility] PRN PRN


   PRN Reason: PROTOCOL


   Stop: 18 10:04


Montelukast Sodium (Singulair)  10 mg PO DAILY [facility]


   Stop: 18 08:59


   Last Admin: 18 08:37 Dose:  Not Given


Morphine Sulfate (Morphine)  1 mg IVP Q4HR PRN


   PRN Reason: Moderate Pain


   Stop: 18 21:35


   Last Admin: 18 03:48 Dose:  1 mg


Mupirocin (Bactroban Oint)  1 appl NS BID [facility]


   Stop: 18 09:01


   Last Admin: 18 09:06 Dose:  1 appl


Ondansetron HCl (Zofran)  4 mg IV Q6H PRN


   PRN Reason: Nausea / Vomiting


   Stop: 18 21:34


   Last Admin: 18 16:26 Dose:  4 mg


Pantoprazole Sodium (Protonix)  40 mg IVP DAILY [facility]


   Stop: 18 08:59


   Last Admin: 18 09:05 Dose:  40 mg


Prednisone (Deltasone)  5 mg PO DAILY [facility]


   Stop: 18 08:59


   Last Admin: 18 08:37 Dose:  Not Given








General: no acute distress, well developed, well nourished


HEENT: atraumatic, normocephalic, PERRLA, EOMI


Neck: supple, no thyromegaly


Cardiovascular: S1S2, regular


Lungs: clear to auscultation bilaterally, clear to percussion


Abdomen: soft, tender, no distended, no mass


Extremities: no cyanosis, no clubbing, no edema


Neurological: awake, alert, oriented


Skin: intact





Infectious Disease Assmt/Plan





- Assessment


Assessment: 


1.  Leukocytosis.


2.  Abdominal pain 2/2 Diverticulitis.


3.  UTI


4.  Hypertension.


5.  Hyperlipidemia.


6. Diabetes mellitus type 2.











- Plan


Plan: 





Continue Amikacin, Levaquin and flagyl.





Nutritional Asmnt/Malnutr-PDOC





- Dietary Evaluation


Malnutrition Findings (Please click <Entered> for more info): 








Nutritional Asmnt/Malnutrition                             Start:  18 14:

47


Text:                                                      Status: Complete    

  


Freq:                                                                          

  


Protocol:                                                                      

  


 Document     18 14:47  LCHENG  (Rec: 18 15:32  [email or web address]  [id number])


 Nutritional Asmnt/Malnutrition


     Patient General Information


      Nutritional Screening                      High Risk


      Diagnosis                                  elevated troponin, hypoxia,


                                                 possible PNA


      Pertinent Medical Hx/Surgical Hx           HTN, CAD, asthma/COPD, PUD/


                                                 GERD, ESRD, dementia


      Subjective Information                     Pt seen on mask at time of


                                                 visit. Pt was NPO for CT


                                                 scheduled today, no PO intake


                                                 information available.


      Current Diet Order/ Nutrition Support      NPO


      Pertinent Medications                      vit C, colace, levaquin,


                                                 synthorid, zofran, nacl 0.9%,


                                                 piperacillin, protonix


      Pertinent Labs                              Na 135, Glucose 190, A1c


                                                 5.1


     Nutritional Hx/Data


      Height                                     1.63 m


      Height (Calculated Centimeters)            162.6


      Current Weight (lbs)                       73.936 kg


      Weight (Calculated Kilograms)              73.9


      Weight (Calculated Grams)                  75255.6


      Ideal Body Weight                          120


      Body Mass Index (BMI)                      27.9


      Weight Status                              Overweight


     GI Symptoms


      GI Symptoms                                None


      Last BM                                    none


      Difficult in:                              None


      Skin Integrity/Comment:                    reddened to coccyx, buttocks


     Estimated Nutritional Goals


      BEE in Kcals:                              Using Current wt


      Calories/Kcals/Kg                          23-27


      Kcals Calculated                           4380-6260


      Protein g/k


      Protein Calculated                         74


      Fluid: ml                                  1702-1998ml (1ml/kcal)


     Nutritional Problem


      1. Problem


       Problem                                   altered nutrition related labs


       Etiology                                  hyperglycemia


       Signs/Symptoms:                           glucose 190 at admission


     Malnutrition Alert


      Is there a minimum of two criteria         No


       selected?                                 


       Query Text:Check all the applicable       


       criteria. A minimum of two criteria are   


       recommended for diagnosis of either       


       severe or non-severe malnutrition.        


     Malnutrition Related to Morbid Obesity


      Malnutrition related to morbid obesity     No


     Intervention/Recommendation


      Comments                                   1. Resume pureed JOJO diet


                                                 after CT exam. Assist pt with


                                                 meals. If glucose continue


                                                 high, will consider adding


                                                 CCHO diet.


                                                 2. Monitor PO intake, wt, labs


                                                 and skin integrity


                                                 3. F/U as high risk in 2-3


                                                 days, -


     Expected Outcomes/Goals


      Expected Outcomes/Goals                    1. PO intake to meet at least


                                                 75% of nutritional needs.


                                                 2. Wt stability, skin to


                                                 remain intact, labs to


                                                 approach WNL.

## 2018-07-02 NOTE — GI PROGRESS NOTE
Subjective





- Review of Systems


Subjective: 





NO EVENTS





Objective





- Results


Result Diagrams: 


 06/30/18 09:59





 07/02/18 04:30


Recent Labs: 


 Laboratory Last Values











WBC  10.8 Th/cmm (4.8-10.8)   06/30/18  09:59    


 


RBC  4.07 Mil/cmm (3.80-5.20)   06/30/18  09:59    


 


Hgb  12.6 gm/dL (12-16)   06/30/18  09:59    


 


Hct  37.3 % (41.0-60)  L  06/30/18  09:59    


 


MCV  91.9 fl ()   06/30/18  09:59    


 


MCH  31.1 pg (27.0-31.0)  H  06/30/18  09:59    


 


MCHC Differential  33.8 pg (28.0-36.0)   06/30/18  09:59    


 


RDW  14.3 % (11.5-20.0)   06/30/18  09:59    


 


Plt Count  199 Th/cmm (150-400)   06/30/18  09:59    


 


MPV  7.7 fl  06/30/18  09:59    


 


Neutrophils %  78.3 % (40.0-80.0)   06/30/18  09:59    


 


Lymphocytes %  15.6 % (20.0-50.0)  L  06/30/18  09:59    


 


Monocytes %  5.9 % (2.0-10.0)   06/30/18  09:59    


 


Eosinophils %  0.1 % (0.0-5.0)   06/30/18  09:59    


 


Basophils %  0.1 % (0.0-2.0)   06/30/18  09:59    


 


PT  15.5 SECONDS (9.5-11.5)  H  06/30/18  11:49    


 


INR  1.46  (0.5-1.4)  H  06/30/18  11:49    


 


PTT (Actin FS)  34.3 SECONDS (26.0-38.0)   06/30/18  11:49    


 


Specimen Source  arterial   06/27/18  18:58    


 


Sample Site  rr   06/27/18  18:58    


 


pH  7.38  (7.35-7.45)   06/27/18  18:58    


 


pCO2  41.0 mmHg (35.0-45.0)   06/27/18  18:58    


 


pO2  44.0 mmHg (80.0-100.0)  L*  06/27/18  18:58    


 


HCO3  23.9 mEq/L (20.0-26.0)   06/27/18  18:58    


 


Base Excess  -0.8 mEq/L (-3.0-3.0)   06/27/18  18:58    


 


O2 Saturation  79.0 % (92.0-100.0)  L  06/27/18  18:58    


 


[place]dal[place] Test  y   06/27/18  18:58    


 


Vent Rate  N/A   06/27/18  18:58    


 


Inspired O2  36   06/27/18  18:58    


 


Tidal Volume  N/A   06/27/18  18:58    


 


PEEP  N/A   06/27/18  18:58    


 


Pressure (ins/psv/peep)  N/A   06/27/18  18:58    


 


Critical Value  MM/JY   06/27/18  18:58    


 


Sodium  144 mEq/L (136-145)   07/02/18  04:30    


 


Potassium  4.4 mEq/L (3.5-5.1)   07/02/18  04:30    


 


Chloride  118 mEq/L ()  H  07/02/18  04:30    


 


Carbon Dioxide  19.4 mEq/L (21.0-31.0)  L  07/02/18  04:30    


 


Anion Gap  11.0  (7.0-16.0)   07/02/18  04:30    


 


BUN  41 mg/dL (7-25)  H  07/02/18  04:30    


 


Creatinine  2.0 mg/dL (0.6-1.2)  H  07/02/18  04:30    


 


Est GFR ( Amer)  TNP   07/02/18  04:30    


 


Est GFR (Non-Af Amer)  TNP   07/02/18  04:30    


 


BUN/Creatinine Ratio  20.5   07/02/18  04:30    


 


Glucose  142 mg/dL ()  H D 07/02/18  04:30    


 


POC Glucose  138 MG/DL (70 - 105)  H  07/02/18  13:43    


 


Hemoglobin A1c %  5.1 % (4.0-6.0)   06/27/18  18:40    


 


Calcium  7.3 mg/dL (8.6-10.3)  L  07/02/18  04:30    


 


Phosphorus  1.6 mg/dL (2.5-5.0)  L  07/02/18  04:30    


 


Magnesium  1.9 mg/dL (1.9-2.7)   07/02/18  04:30    


 


Total Bilirubin  0.6 mg/dL (0.3-1.0)   07/02/18  04:30    


 


Direct Bilirubin  0.24 mg/dL (0.0-0.2)  H  07/02/18  04:30    


 


AST  757 U/L (13-39)  H  07/02/18  04:30    


 


ALT  790 U/L (7-52)  H  07/02/18  04:30    


 


Alkaline Phosphatase  70 U/L ()   07/02/18  04:30    


 


Creatine Kinase  21 U/L ()  L  06/27/18  18:40    


 


Troponin I  5.84 ng/mL (0.01-0.05)  H* D 07/01/18  05:00    


 


B-Natriuretic Peptide  163.0 pg/mL (5.0-100.0)  H  06/27/18  18:40    


 


Total Protein  5.3 gm/dL (6.0-8.3)  L  07/02/18  04:30    


 


Albumin  2.4 gm/dL (3.7-5.3)  L  07/02/18  04:30    


 


Globulin  2.9 gm/dL  07/02/18  04:30    


 


Albumin/Globulin Ratio  0.8  (1.0-1.8)  L  07/02/18  04:30    


 


Triglycerides  149 mg/dL (<150)   06/30/18  12:37    


 


Cholesterol  84 mg/dL (<200)   06/30/18  12:37    


 


LDL Cholesterol Direct  47 mg/dL ()  L  06/30/18  12:37    


 


HDL Cholesterol  18 mg/dL (23-92)  L  06/30/18  12:37    


 


Amylase  12 U/L ()  L  06/27/18  18:40    


 


Lipase  6 U/L (11-82)  L  06/27/18  18:40    


 


TSH  1.72 uIU/ml (0.34-5.60)   06/30/18  09:59    


 


Urine Source  CATH   06/28/18  05:05    


 


Urine Color  YELLOW   06/28/18  05:05    


 


Urine Clarity  TURBID  (CLEAR)  H  06/28/18  05:05    


 


Urine pH  5.5  (4.6 - 8.0)   06/28/18  05:05    


 


Ur Specific Gravity  1.020  (1.005-1.030)   06/28/18  05:05    


 


Urine Protein  TRACE mg/dL (NEGATIVE)   06/28/18  05:05    


 


Urine Glucose (UA)  NEGATIVE mg/dL (NEGATIVE)   06/28/18  05:05    


 


Urine Ketones  15 mg/dL (NEGATIVE)  H  06/28/18  05:05    


 


Urine Blood  NEGATIVE  (NEGATIVE)   06/28/18  05:05    


 


Urine Nitrate  POSITIVE  (NEGATIVE)  H  06/28/18  05:05    


 


Urine Bilirubin  NEGATIVE  (NEGATIVE)   06/28/18  05:05    


 


Urine Urobilinogen  1.0 E.U./dL (0.2 - 1.0)   06/28/18  05:05    


 


Ur Leukocyte Esterase  MODERATE  (NEGATIVE)  H  06/28/18  05:05    


 


Urine RBC  0-2 /hpf (0-5)   06/28/18  05:05    


 


Urine WBC   /hpf (0-5)  H  06/28/18  05:05    


 


Ur Epithelial Cells  MODERATE /lpf (FEW)   06/28/18  05:05    


 


Urine Bacteria  4+ /hpf (NONE SEEN)  H  06/28/18  05:05    


 


Acetaminophen  < 10.0 ug/mL (10.0-30.0)  L  07/02/18  04:30    














- Physical Exam


Vitals and I&O: 


 Vital Signs











Temp  96.6 F   07/02/18 12:00


 


Pulse  88   07/02/18 14:00


 


Resp  20   07/02/18 14:00


 


BP  86/37   07/02/18 14:00


 


Pulse Ox  95   07/02/18 14:00








 Intake & Output











 07/01/18 07/02/18 07/02/18





 18:59 06:59 18:59


 


Intake Total 2155.667 1045 1542.5


 


Balance 2155.667 1045 1542.5


 


Weight (lbs) 79.01 kg 79.832 kg 


 


Intake:   


 


  Intake, IV Amount 2155.667 1045 1542.5


 


    Amikacin 375 mg In 101.5  





    Dextrose 5% 100 ml @ 100   





    mls/hr IV Q48HR [facility] Rx#:   





    [id number]   


 


    Amino Acids 3% /   855





    Electrolytes 1,000 ml @   





    50 mls/hr IV .Q20H [facility] Rx   





    #:[id number]   


 


    D5-0.9%Ns 1,000 ml @ 125 1954.167  





    mls/hr IV .Q8H [facility] Rx#:   





    [id number]   


 


    D5-0.9%Ns 1,000 ml @ 75  945 587.5





    mls/hr IV .[id number] [facility] Rx   





    #:[id number]   


 


    metroNIDAZOLE 500mg/ 100 100





    100mL 500 mg In 100 ml @   





    100 mls/hr IV Q12HR [facility]   





    Rx#:[id number]   


 


  Oral 0  


 


Other:   


 


  # Voids 3 3 


 


  # Bowel Movements 0  


 


  Weight Source Bedscale Bedscale 











Active Medications: 


Current Medications





Acetaminophen (Tylenol)  650 mg PO Q6H PRN


   PRN Reason: Pain (Mild)


   Stop: 08/27/18 02:01


Acetaminophen/Hydrocodone Bitart (Norco 5mg/325mg)  1 tab PO Q6H [facility]


   Stop: 08/26/18 21:29


   Last Admin: 07/02/18 10:55 Dose:  Not Given


Albuterol Sulfate (Albuterol 2.5mg/3ml Neb Ud)  1.25 mg HHN Q2HRT PRN


   PRN Reason: Shortness of Breath


   Stop: 08/27/18 02:12


   Last Admin: 07/01/18 19:19 Dose:  1.25 mg


Ascorbic Acid (Vitamin C)  500 mg PO DAILY [facility]


   Stop: 08/27/18 08:59


   Last Admin: 07/02/18 08:36 Dose:  Not Given


Atorvastatin Calcium (Lipitor)  10 mg PO DAILY [facility]; Protocol


   Stop: 08/30/18 08:59


   Last Admin: 07/02/18 08:37 Dose:  Not Given


Carvedilol (Coreg)  6.25 mg PO BID [facility]


   Stop: 08/28/18 16:59


   Last Admin: 07/02/18 08:37 Dose:  Not Given


Clopidogrel Bisulfate (Plavix)  75 mg PO DAILY [facility]


   Stop: 08/27/18 08:59


   Last Admin: 07/02/18 08:36 Dose:  Not Given


Diltiazem HCl (Cardizem)  60 mg PO Q6HR [facility]


   Stop: 08/28/18 17:59


   Last Admin: 07/02/18 12:09 Dose:  Not Given


Diltiazem HCl (Cardizem)  5 mg IVP Q6H PRN


   PRN Reason: HR more than 125bpm


   Stop: 08/30/18 03:29


Docusate Sodium (Colace)  100 mg PO BID [facility]


   Stop: 08/27/18 08:59


   Last Admin: 07/02/18 08:36 Dose:  Not Given


Donepezil HCl (Aricept)  10 mg PO HS [facility]


   Stop: 08/26/18 21:59


   Last Admin: 07/01/18 22:15 Dose:  Not Given


Enoxaparin Sodium (Lovenox)  80 mg SUBQ DAILY [facility]


   Stop: 08/30/18 08:59


   Last Admin: 07/02/18 08:37 Dose:  Not Given


Enoxaparin Sodium 80 mg/ (Enoxaparin Sodium 40 mg)  120 mg SUBQ DAILY@1400 [facility]


   Stop: 09/01/18 13:59


Gabapentin (Neurontin)  600 mg PO Q6HR [facility]


   Stop: 08/27/18 05:59


   Last Admin: 07/02/18 12:09 Dose:  Not Given


Hydromorphone HCl (Dilaudid)  1 mg IVP Q6H PRN


   PRN Reason: Pain (Moderate)


   Stop: 08/28/18 16:38


   Last Admin: 07/02/18 10:59 Dose:  1 mg


Hydromorphone HCl (Dilaudid)  2 mg IVP Q6H PRN


   PRN Reason: SEVERE PAIN


   Stop: 08/28/18 21:10


   Last Admin: 07/02/18 05:31 Dose:  2 mg


Levofloxacin (Levaquin Pb)  250 mg in 50 mls @ 50 mls/hr IV Q24HR [facility]


   Stop: 08/27/18 00:59


   Last Admin: 07/02/18 00:45 Dose:  50 mls/hr


Metronidazole (Flagyl)  500 mg in 100 mls @ 100 mls/hr IV Q12HR [facility]


   Stop: 08/29/18 20:59


   Last Infusion: 07/02/18 10:05 Dose:  Infused


Amikacin Sulfate 375 mg/ (Dextrose)  101.5 mls @ 100 mls/hr IV Q48HR [facility]


   Stop: 08/30/18 08:59


   Last Infusion: 07/01/18 09:45 Dose:  Infused


Amino Acids/Electrolytes (Procalamine)  1,000 mls @ 50 mls/hr IV .Q20H [facility]


   Stop: 08/30/18 15:59


   Last Admin: 07/02/18 09:06 Dose:  50 mls/hr


Dextrose/Sodium Chloride (D5-0.9%Ns)  1,000 mls @ 50 mls/hr IV .Q20H [facility]


   Stop: 08/31/18 14:14


Insulin Aspart (Novolog Insulin Sliding Scale)  0 units SUBQ Q6HR [facility]; Protocol


   Stop: 08/30/18 17:59


   Last Admin: 07/02/18 13:59 Dose:  Not Given


Levothyroxine Sodium (Synthroid)  0.025 mg PO DAILY@0730 [facility]


   Stop: 08/27/18 07:29


   Last Admin: 07/02/18 06:53 Dose:  Not Given


Lorazepam (Ativan)  0.5 mg IVP Q6HR PRN; Protocol


   PRN Reason: Agitation


   Stop: 08/29/18 11:03


   Last Admin: 07/02/18 13:20 Dose:  0.5 mg


Miscellaneous (Amikacin Iv Per Pharmacy)  1 [facility] PRN PRN


   PRN Reason: Protocol


   Stop: 08/29/18 15:52


Miscellaneous (Lovenox Subq Per Pharmacy)  1 [facility] PRN PRN


   PRN Reason: PROTOCOL


   Stop: 08/29/18 11:40


Miscellaneous (Ppn Per Pharmacy)  1 [facility] PRN PRN


   PRN Reason: PROTOCOL


   Stop: 08/30/18 10:04


Montelukast Sodium (Singulair)  10 mg PO DAILY [facility]


   Stop: 08/27/18 08:59


   Last Admin: 07/02/18 08:37 Dose:  Not Given


Morphine Sulfate (Morphine)  1 mg IVP Q4HR PRN


   PRN Reason: Moderate Pain


   Stop: 08/26/18 21:35


   Last Admin: 07/02/18 03:48 Dose:  1 mg


Mupirocin (Bactroban Oint)  1 appl NS BID [facility]


   Stop: 07/04/18 09:01


   Last Admin: 07/02/18 09:06 Dose:  1 appl


Ondansetron HCl (Zofran)  4 mg IV Q6H PRN


   PRN Reason: Nausea / Vomiting


   Stop: 08/26/18 21:34


   Last Admin: 06/29/18 16:26 Dose:  4 mg


Pantoprazole Sodium (Protonix)  40 mg IVP DAILY [facility]


   Stop: 08/30/18 08:59


   Last Admin: 07/02/18 09:05 Dose:  40 mg


Prednisone (Deltasone)  5 mg PO DAILY [facility]


   Stop: 08/27/18 08:59


   Last Admin: 07/02/18 08:37 Dose:  Not Given








General: Mild distress (confused)


Neck: Supple


Cardiovascular: Regular rate


Lungs: Clear to auscultation


Abdomen: Bowel sounds, Soft, Tender (LLQ), Distended (MILD)





Assessment/Plan





- Assessment


Assessment: 





88 YO FEMALE WITH SIGMOID DIVERTICULITIS.  MAY ALSO HAVE ISCHEMIC COLITIS.


ALSO WITH CHOLELITHIASIS AND ELEVATED TRANSAMINASES - R/O ISCHEMIC HEPATITIS 

VS. CONGESTION FROM CHF.


 


1. ABX.


2. NPO AND TPN.


3. PER CARDS TO OPTIMIZE CARDIAC FUNCTION.


4. FAILED SWALLOW EVAL.  POLST OUTLINED WISH FOR DNR AND NO ARTIFICIAL FEEDING.


5. CHECK HIDA

## 2018-07-02 NOTE — CARDIOLOGY
06/30/2018



The patient of Dr. Germain. 



PROCEDURE:  Echocardiogram.



M-MODE ECHOCARDIOGRAM:  Mitral valve, anterior leaflet of mitral valve shows

normal excursion, EF velocity.  Posterior leaflet of mitral valve shows normal

excursion.  Left ventricular posterior wall shows increased thickness, normal

excursion.  Interventricular septum shows increased thickness, normal excursion,

hypertrophy of the left ventricle, ejection fraction 37%.  Left atrium enlarged

at 4.1 cm.  Aortic root shows normal dimension, normal excursion of aortic

leaflets.



CONCLUSION:  Cardiomyopathy, ejection fraction 37%, left atrial enlargement.



2D ECHO:  Long axis view shows enlarged left ventricular cavity with decreased

ejection fraction, hypertrophy of the left ventricle.  Left atrial enlargement. 

Aortic root shows normal dimension, normal excursion of aortic leaflets.  Short

axis view of mitral valve normal.  Short axis view of aortic valve normal. 

Apical four chamber view showed normal sized left ventricle with hypertrophy of

the left ventricle.  Left atrium enlarged.  Right ventricular cavity, right

atrium normal, no pericardial effusion.



CONCLUSION:  Left atrial enlargement, cardiomyopathy, hypertrophy of the left

ventricle, ejection fraction 37%.



Doppler study shows mild mitral regurgitation, moderate tricuspid regurgitation,

trace pulmonary regurgitation, right ventricular systolic pressure 40 mmHg.





DD: 07/02/2018 13:08

DT: 07/02/2018 16:07

[facility]# [id number]  [id number]

## 2018-07-02 NOTE — DIAGNOSTIC IMAGING REPORT
Ultrasound abdomen



HISTORY: Elevated liver function tests



COMPARISON: CT abdomen and pelvis on 6/28/2019



Technique: Sonography of the abdomen was performed in multiple planes.



FINDINGS:



Exam is limited due to bowel gas and patient's medical condition. 

Assessment of the abdominal aorta is limited on CT exam.  The liver

demonstrates normal echogenicity.  The liver margin is not well-defined,

however, no obvious focal lesions identified.  Multiple gallstones are

noted.  The gallbladder wall measures 2.7 mm.  The common bile duct

measures 2 mm.  Evaluation of the pancreas is limited due to bowel gas.



The right kidney measures 10.2 x 4.3 cm.  The left kidney measures 8.5 x

4.7 cm.  The renal margins are not well-defined however no obvious focal

lesions or evidence of hydronephrosis.  The spleen measures 9.9 cm.



IMPRESSION:



Limited exam.  Multiple gallstones are noted.  No definite evidence of

gallbladder wall thickening.  No evidence of common bile duct

dilatation.



Limited assessment of the liver.  No obvious focal abnormality

identified.

## 2018-07-02 NOTE — GENERAL PROGRESS NOTE
Subjective





- Review of Systems


Events since last encounter: 





no distress





Objective





- Results


Result Diagrams: 


 18 09:59





 18 04:30


Recent Labs: 


 Laboratory Last Values











WBC  10.8 Th/cmm (4.8-10.8)   18  09:59    


 


RBC  4.07 Mil/cmm (3.80-5.20)   18  09:59    


 


Hgb  12.6 gm/dL (12-16)   18  09:59    


 


Hct  37.3 % (41.0-60)  L  18  09:59    


 


MCV  91.9 fl ()   18  09:59    


 


MCH  31.1 pg (27.0-31.0)  H  18  09:59    


 


MCHC Differential  33.8 pg (28.0-36.0)   18  09:59    


 


RDW  14.3 % (11.5-20.0)   18  09:59    


 


Plt Count  199 Th/cmm (150-400)   18  09:59    


 


MPV  7.7 fl  18  09:59    


 


Neutrophils %  78.3 % (40.0-80.0)   18  09:59    


 


Lymphocytes %  15.6 % (20.0-50.0)  L  18  09:59    


 


Monocytes %  5.9 % (2.0-10.0)   18  09:59    


 


Eosinophils %  0.1 % (0.0-5.0)   18  09:59    


 


Basophils %  0.1 % (0.0-2.0)   18  09:59    


 


PT  15.5 SECONDS (9.5-11.5)  H  18  11:49    


 


INR  1.46  (0.5-1.4)  H  18  11:49    


 


PTT (Actin FS)  34.3 SECONDS (26.0-38.0)   18  11:49    


 


Specimen Source  arterial   18  18:58    


 


Sample Site  rr   18  18:58    


 


pH  7.38  (7.35-7.45)   18  18:58    


 


pCO2  41.0 mmHg (35.0-45.0)   18  18:58    


 


pO2  44.0 mmHg (80.0-100.0)  L*  18  18:58    


 


HCO3  23.9 mEq/L (20.0-26.0)   18  18:58    


 


Base Excess  -0.8 mEq/L (-3.0-3.0)   18  18:58    


 


O2 Saturation  79.0 % (92.0-100.0)  L  18  18:58    


 


Magdaleno Test  y   18  18:58    


 


Vent Rate  N/A   18  18:58    


 


Inspired O2  36   18  18:58    


 


Tidal Volume  N/A   18  18:58    


 


PEEP  N/A   18  18:58    


 


Pressure (ins/psv/peep)  N/A   18  18:58    


 


Critical Value  MM/JY   18  18:58    


 


Sodium  144 mEq/L (136-145)   18  04:30    


 


Potassium  4.4 mEq/L (3.5-5.1)   18  04:30    


 


Chloride  118 mEq/L ()  H  18  04:30    


 


Carbon Dioxide  19.4 mEq/L (21.0-31.0)  L  18  04:30    


 


Anion Gap  11.0  (7.0-16.0)   18  04:30    


 


BUN  41 mg/dL (7-25)  H  18  04:30    


 


Creatinine  2.0 mg/dL (0.6-1.2)  H  18  04:30    


 


Est GFR ( Amer)  TNP   18  04:30    


 


Est GFR (Non-Af Amer)  TNP   18  04:30    


 


BUN/Creatinine Ratio  20.5   18  04:30    


 


Glucose  142 mg/dL ()  H D 18  04:30    


 


POC Glucose  138 MG/DL (70 - 105)  H  18  13:43    


 


Hemoglobin A1c %  5.1 % (4.0-6.0)   18  18:40    


 


Calcium  7.3 mg/dL (8.6-10.3)  L  18  04:30    


 


Phosphorus  1.6 mg/dL (2.5-5.0)  L  18  04:30    


 


Magnesium  1.9 mg/dL (1.9-2.7)   18  04:30    


 


Total Bilirubin  0.6 mg/dL (0.3-1.0)   18  04:30    


 


Direct Bilirubin  0.24 mg/dL (0.0-0.2)  H  18  04:30    


 


AST  757 U/L (13-39)  H  18  04:30    


 


ALT  790 U/L (7-52)  H  18  04:30    


 


Alkaline Phosphatase  70 U/L ()   18  04:30    


 


Creatine Kinase  21 U/L ()  L  18  18:40    


 


Troponin I  5.84 ng/mL (0.01-0.05)  H* D 18  05:00    


 


B-Natriuretic Peptide  163.0 pg/mL (5.0-100.0)  H  18  18:40    


 


Total Protein  5.3 gm/dL (6.0-8.3)  L  18  04:30    


 


Albumin  2.4 gm/dL (3.7-5.3)  L  18  04:30    


 


Globulin  2.9 gm/dL  18  04:30    


 


Albumin/Globulin Ratio  0.8  (1.0-1.8)  L  18  04:30    


 


Triglycerides  149 mg/dL (<150)   18  12:37    


 


Cholesterol  84 mg/dL (<200)   18  12:37    


 


LDL Cholesterol Direct  47 mg/dL ()  L  18  12:37    


 


HDL Cholesterol  18 mg/dL (23-92)  L  18  12:37    


 


Amylase  12 U/L ()  L  18  18:40    


 


Lipase  6 U/L (11-82)  L  18  18:40    


 


TSH  1.72 uIU/ml (0.34-5.60)   18  09:59    


 


Urine Source  CATH   18  05:05    


 


Urine Color  YELLOW   18  05:05    


 


Urine Clarity  TURBID  (CLEAR)  H  18  05:05    


 


Urine pH  5.5  (4.6 - 8.0)   18  05:05    


 


Ur Specific Gravity  1.020  (1.005-1.030)   18  05:05    


 


Urine Protein  TRACE mg/dL (NEGATIVE)   18  05:05    


 


Urine Glucose (UA)  NEGATIVE mg/dL (NEGATIVE)   18  05:05    


 


Urine Ketones  15 mg/dL (NEGATIVE)  H  18  05:05    


 


Urine Blood  NEGATIVE  (NEGATIVE)   18  05:05    


 


Urine Nitrate  POSITIVE  (NEGATIVE)  H  18  05:05    


 


Urine Bilirubin  NEGATIVE  (NEGATIVE)   18  05:05    


 


Urine Urobilinogen  1.0 E.U./dL (0.2 - 1.0)   18  05:05    


 


Ur Leukocyte Esterase  MODERATE  (NEGATIVE)  H  18  05:05    


 


Urine RBC  0-2 /hpf (0-5)   18  05:05    


 


Urine WBC   /hpf (0-5)  H  18  05:05    


 


Ur Epithelial Cells  MODERATE /lpf (FEW)   18  05:05    


 


Urine Bacteria  4+ /hpf (NONE SEEN)  H  18  05:05    


 


Acetaminophen  < 10.0 ug/mL (10.0-30.0)  L  18  04:30    














- Physical Exam


Vitals and I&O: 


 Vital Signs











Temp  96.6 F   18 12:00


 


Pulse  88   18 14:00


 


Resp  20   18 14:00


 


BP  86/37   18 14:00


 


Pulse Ox  95   18 14:00








 Intake & Output











 18





 18:59 06:59 18:59


 


Intake Total 2155.667 1045 1542.5


 


Balance 2155.667 1045 1542.5


 


Weight (lbs) 79.01 kg 79.832 kg 


 


Intake:   


 


  Intake, IV Amount 2155.667 1045 1542.5


 


    Amikacin 375 mg In 101.5  





    Dextrose 5% 100 ml @ 100   





    mls/hr IV Q48HR [facility] Rx#:   





    [id number]   


 


    Amino Acids 3% /   855





    Electrolytes 1,000 ml @   





    50 mls/hr IV .Q20H [facility] Rx   





    #:[id number]   


 


    D5-0.9%Ns 1,000 ml @ 125 1954.167  





    mls/hr IV [facility]Q8H [facility]   





    [id number]   


 


    D5-0.9%Ns 1,000 ml @ 75  945 587.5





    mls/hr IV .[id number] [facility] Rx   





    #:[id number]   


 


    metroNIDAZOLE 500mg/ 100 100





    100mL 500 mg In 100 ml @   





    100 mls/hr IV Q12HR [facility]   





    Rx#:[id number]   


 


  Oral 0  


 


Other:   


 


  # Voids 3 3 


 


  # Bowel Movements 0  


 


  Weight Source Bedscale Bedscale 











Active Medications: 


Current Medications





Acetaminophen (Tylenol)  650 mg PO Q6H PRN


   PRN Reason: Pain (Mild)


   Stop: 18 02:01


Acetaminophen/Hydrocodone Bitart (Norco 5mg/325mg)  1 tab PO Q6H [facility]


   Stop: 18 21:29


   Last Admin: 18 15:45 Dose:  Not Given


Albuterol Sulfate (Albuterol 2.5mg/3ml Neb Ud)  1.25 mg HHN Q2HRT PRN


   PRN Reason: Shortness of Breath


   Stop: 18 02:12


   Last Admin: 18 19:19 Dose:  1.25 mg


Ascorbic Acid (Vitamin C)  500 mg PO DAILY [facility]


   Stop: 18 08:59


   Last Admin: 18 08:36 Dose:  Not Given


Atorvastatin Calcium (Lipitor)  10 mg PO DAILY [facility]; Protocol


   Stop: 18 08:59


   Last Admin: 18 08:37 Dose:  Not Given


Carvedilol (Coreg)  6.25 mg PO BID [facility]


   Stop: 18 16:59


   Last Admin: 18 08:37 Dose:  Not Given


Clopidogrel Bisulfate (Plavix)  75 mg PO DAILY [facility]


   Stop: 18 08:59


   Last Admin: 18 08:36 Dose:  Not Given


Diltiazem HCl (Cardizem)  60 mg PO Q6HR [facility]


   Stop: 18 17:59


   Last Admin: 18 12:09 Dose:  Not Given


Diltiazem HCl (Cardizem)  5 mg IVP Q6H PRN


   PRN Reason: HR more than 125bpm


   Stop: 18 03:29


Docusate Sodium (Colace)  100 mg PO BID [facility]


   Stop: 18 08:59


   Last Admin: 18 08:36 Dose:  Not Given


Donepezil HCl (Aricept)  10 mg PO HS [facility]


   Stop: 18 21:59


   Last Admin: 18 22:15 Dose:  Not Given


Enoxaparin Sodium (Lovenox)  80 mg SUBQ DAILY [facility]


   Stop: 18 08:59


   Last Admin: 18 08:37 Dose:  Not Given


Enoxaparin Sodium 80 mg/ (Enoxaparin Sodium 40 mg)  120 mg SUBQ DAILY@1400 [facility]


   Stop: 18 13:59


Gabapentin (Neurontin)  600 mg PO Q6HR [facility]


   Stop: 18 05:59


   Last Admin: 18 12:09 Dose:  Not Given


Hydromorphone HCl (Dilaudid)  1 mg IVP Q6H PRN


   PRN Reason: Pain (Moderate)


   Stop: 18 16:38


   Last Admin: 18 10:59 Dose:  1 mg


Hydromorphone HCl (Dilaudid)  2 mg IVP Q6H PRN


   PRN Reason: SEVERE PAIN


   Stop: 18 21:10


   Last Admin: 18 05:31 Dose:  2 mg


Levofloxacin (Levaquin Pb)  250 mg in 50 mls @ 50 mls/hr IV Q24HR [facility]


   Stop: 18 00:59


   Last Admin: 18 00:45 Dose:  50 mls/hr


Metronidazole (Flagyl)  500 mg in 100 mls @ 100 mls/hr IV Q12HR [facility]


   Stop: 18 20:59


   Last Infusion: 18 10:05 Dose:  Infused


Amikacin Sulfate 375 mg/ (Dextrose)  101.5 mls @ 100 mls/hr IV Q48HR [facility]


   Stop: 18 08:59


   Last Infusion: 18 09:45 Dose:  Infused


Amino Acids/Electrolytes (Procalamine)  1,000 mls @ 50 mls/hr IV .Q20H [facility]


   Stop: 18 15:59


   Last Admin: 18 09:06 Dose:  50 mls/hr


Dextrose/Sodium Chloride (D5-0.9%Ns)  1,000 mls @ 50 mls/hr IV .Q20H [facility]


   Stop: 18 14:14


Insulin Aspart (Novolog Insulin Sliding Scale)  0 units SUBQ Q6HR [facility]; Protocol


   Stop: 18 17:59


   Last Admin: 18 13:59 Dose:  Not Given


Levothyroxine Sodium (Synthroid)  0.025 mg PO DAILY@0730 [facility]


   Stop: 18 07:29


   Last Admin: 18 06:53 Dose:  Not Given


Lorazepam (Ativan)  0.5 mg IVP Q6HR PRN; Protocol


   PRN Reason: Agitation


   Stop: 18 11:03


   Last Admin: 18 13:20 Dose:  0.5 mg


Miscellaneous (Amikacin Iv Per Pharmacy)  1 [facility] PRN PRN


   PRN Reason: Protocol


   Stop: 18 15:52


Miscellaneous (Lovenox Subq Per Pharmacy)  1 [facility] PRN PRN


   PRN Reason: PROTOCOL


   Stop: 18 11:40


Miscellaneous (Ppn Per Pharmacy)  1 [facility] PRN PRN


   PRN Reason: PROTOCOL


   Stop: 18 10:04


Montelukast Sodium (Singulair)  10 mg PO DAILY [facility]


   Stop: 18 08:59


   Last Admin: 18 08:37 Dose:  Not Given


Morphine Sulfate (Morphine)  1 mg IVP Q4HR PRN


   PRN Reason: Moderate Pain


   Stop: 18 21:35


   Last Admin: 18 03:48 Dose:  1 mg


Mupirocin (Bactroban Oint)  1 appl NS BID [facility]


   Stop: 18 09:01


   Last Admin: 18 09:06 Dose:  1 appl


Ondansetron HCl (Zofran)  4 mg IV Q6H PRN


   PRN Reason: Nausea / Vomiting


   Stop: 18 21:34


   Last Admin: 18 16:26 Dose:  4 mg


Pantoprazole Sodium (Protonix)  40 mg IVP DAILY [facility]


   Stop: 18 08:59


   Last Admin: 18 09:05 Dose:  40 mg


Prednisone (Deltasone)  5 mg PO DAILY [facility]


   Stop: 18 08:59


   Last Admin: 18 08:37 Dose:  Not Given








General: Mild distress (confused)


Neck: Supple


Cardiovascular: Regular rate


Lungs: Clear to auscultation


Abdomen: Bowel sounds, Soft, Tender (LLQ), Distended (MILD)





Assessment/Plan





- Assessment


Assessment: 





abdominal pain


gastroenteritis


cad


copd 


pud


gerd


dementia








- Plan


Plan: 





iv vanco as per id


cbc/bmp in am


continue the rest of the orders 





Nutritional Asmnt/Malnutr-PDOC





- Dietary Evaluation


Malnutrition Findings (Please click <Entered> for more info): 








Nutritional Asmnt/Malnutrition                             Start:  18 14:

47


Text:                                                      Status: Complete    

  


Freq:                                                                          

  


Protocol:                                                                      

  


 Document     18 14:47  LCMARIANAG  (Rec: 18 15:32  DIMITRIOS  [id number])


 Nutritional Asmnt/Malnutrition


     Patient General Information


      Nutritional Screening                      High Risk


      Diagnosis                                  elevated troponin, hypoxia,


                                                 possible PNA


      Pertinent Medical Hx/Surgical Hx           HTN, CAD, asthma/COPD, PUD/


                                                 GERD, ESRD, dementia


      Subjective Information                     Pt seen on mask at time of


                                                 visit. Pt was NPO for CT


                                                 scheduled today, no PO intake


                                                 information available.


      Current Diet Order/ Nutrition Support      NPO


      Pertinent Medications                      vit C, colace, levaquin,


                                                 synthorid, zofran, nacl 0.9%,


                                                 piperacillin, protonix


      Pertinent Labs                              Na 135, Glucose 190, A1c


                                                 5.1


     Nutritional Hx/Data


      Height                                     1.63 m


      Height (Calculated Centimeters)            162.6


      Current Weight (lbs)                       73.936 kg


      Weight (Calculated Kilograms)              73.9


      Weight (Calculated Grams)                  32781.6


      Ideal Body Weight                          120


      Body Mass Index (BMI)                      27.9


      Weight Status                              Overweight


     GI Symptoms


      GI Symptoms                                None


      Last BM                                    none


      Difficult in:                              None


      Skin Integrity/Comment:                    reddened to coccyx, buttocks


     Estimated Nutritional Goals


      BEE in Kcals:                              Using Current wt


      Calories/Kcals/Kg                          23-27


      Kcals Calculated                           1353-3093


      Protein g/k


      Protein Calculated                         74


      Fluid: ml                                  1702-1998ml (1ml/kcal)


     Nutritional Problem


      1. Problem


       Problem                                   altered nutrition related labs


       Etiology                                  hyperglycemia


       Signs/Symptoms:                           glucose 190 at admission


     Malnutrition Alert


      Is there a minimum of two criteria         No


       selected?                                 


       Query Text:Check all the applicable       


       criteria. A minimum of two criteria are   


       recommended for diagnosis of either       


       severe or non-severe malnutrition.        


     Malnutrition Related to Morbid Obesity


      Malnutrition related to morbid obesity     No


     Intervention/Recommendation


      Comments                                   1. Resume pureed JOJO diet


                                                 after CT exam. Assist pt with


                                                 meals. If glucose continue


                                                 high, will consider adding


                                                 CCHO diet.


                                                 2. Monitor PO intake, wt, labs


                                                 and skin integrity


                                                 3. F/U as high risk in 2-3


                                                 days, -


     Expected Outcomes/Goals


      Expected Outcomes/Goals                    1. PO intake to meet at least


                                                 75% of nutritional needs.


                                                 2. Wt stability, skin to


                                                 remain intact, labs to


                                                 approach WNL.

## 2018-07-03 LAB
ALBUMIN SERPL-MCNC: 2.3 GM/DL (ref 3.7–5.3)
ALBUMIN/GLOB SERPL: 0.8 {RATIO} (ref 1–1.8)
ALP SERPL-CCNC: 53 U/L (ref 34–104)
ALT SERPL-CCNC: 461 U/L (ref 7–52)
ANION GAP SERPL CALC-SCNC: 11.1 MMOL/L (ref 7–16)
AST SERPL-CCNC: 190 U/L (ref 13–39)
BILIRUB SERPL-MCNC: 0.6 MG/DL (ref 0.3–1)
BUN SERPL-MCNC: 43 MG/DL (ref 7–25)
CALCIUM SERPL-MCNC: 7.6 MG/DL (ref 8.6–10.3)
CHLORIDE SERPL-SCNC: 118 MEQ/L (ref 98–107)
CO2 SERPL-SCNC: 17.4 MEQ/L (ref 21–31)
CREAT SERPL-MCNC: 1.9 MG/DL (ref 0.6–1.2)
GLOBULIN SER-MCNC: 2.8 GM/DL
GLUCOSE SERPL-MCNC: 128 MG/DL (ref 70–105)
MAGNESIUM SERPL-MCNC: 2 MG/DL (ref 1.9–2.7)
PHOSPHATE SERPL-MCNC: 1.2 MG/DL (ref 2.5–5)
POTASSIUM SERPL-SCNC: 4.5 MEQ/L (ref 3.5–5.1)
SODIUM SERPL-SCNC: 142 MEQ/L (ref 136–145)

## 2018-07-03 RX ADMIN — HYDROCODONE BITARTRATE AND ACETAMINOPHEN SCH: 5; 325 TABLET ORAL at 21:15

## 2018-07-03 RX ADMIN — HYDROCODONE BITARTRATE AND ACETAMINOPHEN SCH: 5; 325 TABLET ORAL at 15:35

## 2018-07-03 RX ADMIN — HYDROMORPHONE HYDROCHLORIDE PRN MG: 2 INJECTION INTRAMUSCULAR; INTRAVENOUS; SUBCUTANEOUS at 15:20

## 2018-07-03 RX ADMIN — LEVOFLOXACIN SCH MLS/HR: 5 INJECTION INTRAVENOUS at 00:11

## 2018-07-03 RX ADMIN — DILTIAZEM HYDROCHLORIDE SCH: 30 TABLET, FILM COATED ORAL at 11:19

## 2018-07-03 RX ADMIN — LEVOTHYROXINE SODIUM SCH: 100 TABLET ORAL at 06:38

## 2018-07-03 RX ADMIN — ENOXAPARIN SODIUM SCH: 80 INJECTION SUBCUTANEOUS at 08:26

## 2018-07-03 RX ADMIN — ASCORBIC ACID, VITAMIN A PALMITATE, CHOLECALCIFEROL, THIAMINE HYDROCHLORIDE, RIBOFLAVIN-5 PHOSPHATE SODIUM, PYRIDOXINE HYDROCHLORIDE, NIACINAMIDE, DEXPANTHENOL, ALPHA-TOCOPHEROL ACETATE, VITAMIN K1, FOLIC ACID, BIOTIN, CYANOCOBALAMIN SCH MLS/HR: 200; 3300; 200; 6; 3.6; 6; 40; 15; 10; 150; 600; 60; 5 INJECTION, SOLUTION INTRAVENOUS at 16:11

## 2018-07-03 RX ADMIN — GLYCERIN, ISOLEUCINE, LEUCINE, LYSINE, METHIONINE, PHENYLALANINE, THREONINE, TRYPTOPHAN, VALINE, ALANINE, GLYCINE, ARGININE, HISTIDINE, PROLINE, SERINE, CYSTEINE, SODIUM ACETATE, MAGNESIUM ACETATE, CALCIUM ACETATE, SODIUM CHLORIDE, POTASSIUM CHLORIDE, PHOSPHORIC ACID, AND POTASSIUM METABISULFITE SCH MLS/HR
3; .21; .27; .22; .16; .17; .12; .046; .2; .21; .42; .29; .085; .34; .18; .014; .2; .054; .026; .12; .15; .041 INJECTION INTRAVENOUS at 05:52

## 2018-07-03 RX ADMIN — INSULIN ASPART SCH: 100 INJECTION, SOLUTION INTRAVENOUS; SUBCUTANEOUS at 06:09

## 2018-07-03 RX ADMIN — HYDROCODONE BITARTRATE AND ACETAMINOPHEN SCH: 5; 325 TABLET ORAL at 10:07

## 2018-07-03 RX ADMIN — INSULIN ASPART SCH: 100 INJECTION, SOLUTION INTRAVENOUS; SUBCUTANEOUS at 15:40

## 2018-07-03 RX ADMIN — DILTIAZEM HYDROCHLORIDE SCH: 30 TABLET, FILM COATED ORAL at 06:08

## 2018-07-03 RX ADMIN — HYDROMORPHONE HYDROCHLORIDE PRN MG: 2 INJECTION INTRAMUSCULAR; INTRAVENOUS; SUBCUTANEOUS at 21:16

## 2018-07-03 RX ADMIN — DILTIAZEM HYDROCHLORIDE SCH: 30 TABLET, FILM COATED ORAL at 00:09

## 2018-07-03 RX ADMIN — DILTIAZEM HYDROCHLORIDE SCH: 30 TABLET, FILM COATED ORAL at 18:46

## 2018-07-03 RX ADMIN — INSULIN ASPART SCH: 100 INJECTION, SOLUTION INTRAVENOUS; SUBCUTANEOUS at 18:46

## 2018-07-03 RX ADMIN — INSULIN ASPART SCH: 100 INJECTION, SOLUTION INTRAVENOUS; SUBCUTANEOUS at 00:09

## 2018-07-03 RX ADMIN — METRONIDAZOLE SCH MLS/HR: 500 INJECTION, SOLUTION INTRAVENOUS at 08:31

## 2018-07-03 RX ADMIN — HYDROCODONE BITARTRATE AND ACETAMINOPHEN SCH: 5; 325 TABLET ORAL at 05:05

## 2018-07-03 RX ADMIN — ALBUTEROL SULFATE PRN MG: 2.5 SOLUTION RESPIRATORY (INHALATION) at 07:39

## 2018-07-03 RX ADMIN — METRONIDAZOLE SCH MLS/HR: 500 INJECTION, SOLUTION INTRAVENOUS at 20:00

## 2018-07-03 NOTE — GENERAL PROGRESS NOTE
Subjective





- Review of Systems


Service Date: 18


Subjective: 


awake, nad





Objective





- Results


Result Diagrams: 


 18 09:59





 18 05:50


Recent Labs: 


 Laboratory Last Values











WBC  10.8 Th/cmm (4.8-10.8)   18  09:59    


 


RBC  4.07 Mil/cmm (3.80-5.20)   18  09:59    


 


Hgb  12.6 gm/dL (12-16)   18  09:59    


 


Hct  37.3 % (41.0-60)  L  18  09:59    


 


MCV  91.9 fl ()   18  09:59    


 


MCH  31.1 pg (27.0-31.0)  H  18  09:59    


 


MCHC Differential  33.8 pg (28.0-36.0)   18  09:59    


 


RDW  14.3 % (11.5-20.0)   18  09:59    


 


Plt Count  199 Th/cmm (150-400)   18  09:59    


 


MPV  7.7 fl  18  09:59    


 


Neutrophils %  78.3 % (40.0-80.0)   18  09:59    


 


Lymphocytes %  15.6 % (20.0-50.0)  L  18  09:59    


 


Monocytes %  5.9 % (2.0-10.0)   18  09:59    


 


Eosinophils %  0.1 % (0.0-5.0)   18  09:59    


 


Basophils %  0.1 % (0.0-2.0)   18  09:59    


 


PT  15.5 SECONDS (9.5-11.5)  H  18  11:49    


 


INR  1.46  (0.5-1.4)  H  18  11:49    


 


PTT (Actin FS)  34.3 SECONDS (26.0-38.0)   18  11:49    


 


Specimen Source  arterial   18  18:58    


 


Sample Site  rr   18  18:58    


 


pH  7.38  (7.35-7.45)   18  18:58    


 


pCO2  41.0 mmHg (35.0-45.0)   18  18:58    


 


pO2  44.0 mmHg (80.0-100.0)  L*  18  18:58    


 


HCO3  23.9 mEq/L (20.0-26.0)   18  18:58    


 


Base Excess  -0.8 mEq/L (-3.0-3.0)   18  18:58    


 


O2 Saturation  79.0 % (92.0-100.0)  L  18  18:58    


 


Magdaleno Test  y   18  18:58    


 


Vent Rate  N/A   18  18:58    


 


Inspired O2  36   18  18:58    


 


Tidal Volume  N/A   18  18:58    


 


PEEP  N/A   18  18:58    


 


Pressure (ins/psv/peep)  N/A   18  18:58    


 


Critical Value  MM/JY   18  18:58    


 


Sodium  142 mEq/L (136-145)   18  05:50    


 


Potassium  4.5 mEq/L (3.5-5.1)   18  05:50    


 


Chloride  118 mEq/L ()  H  18  05:50    


 


Carbon Dioxide  17.4 mEq/L (21.0-31.0)  L  18  05:50    


 


Anion Gap  11.1  (7.0-16.0)   18  05:50    


 


BUN  43 mg/dL (7-25)  H  18  05:50    


 


Creatinine  1.9 mg/dL (0.6-1.2)  H  18  05:50    


 


Est GFR ( Amer)  TNP   18  05:50    


 


Est GFR (Non-Af Amer)  TNP   18  05:50    


 


BUN/Creatinine Ratio  22.6   18  05:50    


 


Glucose  128 mg/dL ()  H  18  05:50    


 


POC Glucose  135 MG/DL (70 - 105)  H  18  18:25    


 


Hemoglobin A1c %  5.1 % (4.0-6.0)   18  18:40    


 


Calcium  7.6 mg/dL (8.6-10.3)  L  18  05:50    


 


Phosphorus  1.2 mg/dL (2.5-5.0)  L  18  05:50    


 


Magnesium  2.0 mg/dL (1.9-2.7)   18  05:50    


 


Total Bilirubin  0.6 mg/dL (0.3-1.0)   18  05:50    


 


Direct Bilirubin  0.24 mg/dL (0.0-0.2)  H  18  04:30    


 


AST  190 U/L (13-39)  H  18  05:50    


 


ALT  461 U/L (7-52)  H  18  05:50    


 


Alkaline Phosphatase  53 U/L ()   18  05:50    


 


Creatine Kinase  21 U/L ()  L  18  18:40    


 


Troponin I  0.80 ng/mL (0.01-0.05)  H* D 18  05:50    


 


B-Natriuretic Peptide  > 5000.0 pg/mL (5.0-100.0)  H  18  05:50    


 


Total Protein  5.1 gm/dL (6.0-8.3)  L  18  05:50    


 


Albumin  2.3 gm/dL (3.7-5.3)  L  18  05:50    


 


Globulin  2.8 gm/dL  18  05:50    


 


Albumin/Globulin Ratio  0.8  (1.0-1.8)  L  18  05:50    


 


Triglycerides  149 mg/dL (<150)   18  12:37    


 


Cholesterol  84 mg/dL (<200)   18  12:37    


 


LDL Cholesterol Direct  47 mg/dL ()  L  18  12:37    


 


HDL Cholesterol  18 mg/dL (23-92)  L  18  12:37    


 


Amylase  12 U/L ()  L  18  18:40    


 


Lipase  6 U/L (11-82)  L  18  18:40    


 


TSH  1.72 uIU/ml (0.34-5.60)   18  09:59    


 


Urine Source  CATH   18  05:05    


 


Urine Color  YELLOW   18  05:05    


 


Urine Clarity  TURBID  (CLEAR)  H  18  05:05    


 


Urine pH  5.5  (4.6 - 8.0)   18  05:05    


 


Ur Specific Gravity  1.020  (1.005-1.030)   18  05:05    


 


Urine Protein  TRACE mg/dL (NEGATIVE)   18  05:05    


 


Urine Glucose (UA)  NEGATIVE mg/dL (NEGATIVE)   18  05:05    


 


Urine Ketones  15 mg/dL (NEGATIVE)  H  18  05:05    


 


Urine Blood  NEGATIVE  (NEGATIVE)   18  05:05    


 


Urine Nitrate  POSITIVE  (NEGATIVE)  H  18  05:05    


 


Urine Bilirubin  NEGATIVE  (NEGATIVE)   18  05:05    


 


Urine Urobilinogen  1.0 E.U./dL (0.2 - 1.0)   18  05:05    


 


Ur Leukocyte Esterase  MODERATE  (NEGATIVE)  H  18  05:05    


 


Urine RBC  0-2 /hpf (0-5)   18  05:05    


 


Urine WBC   /hpf (0-5)  H  18  05:05    


 


Ur Epithelial Cells  MODERATE /lpf (FEW)   18  05:05    


 


Urine Bacteria  4+ /hpf (NONE SEEN)  H  18  05:05    


 


Acetaminophen  < 10.0 ug/mL (10.0-30.0)  L  18  04:30    














- Physical Exam


Vitals and I&O: 


 Vital Signs











Temp  98.0 F   18 16:00


 


Pulse  111   18 19:55


 


Resp  20   18 19:55


 


BP  89/31   18 18:00


 


Pulse Ox  98   18 19:55








 Intake & Output











 18





 06:59 18:59 06:59


 


Intake Total 1100 801.5 


 


Balance 1100 801.5 


 


Weight (lbs) 80.286 kg 80.286 kg 


 


Intake:   


 


  Intake, IV Amount 1100 201.5 


 


    Amikacin 375 mg In  101.5 





    Dextrose 5% 100 ml @ 100   





    mls/hr IV Q48HR [facility] Rx#:   





    [id number]   


 


    Amino Acids 3% / 1000  





    Electrolytes 1,000 ml @   





    50 mls/hr IV .Q20H [facility] Rx   





    #:[id number]   


 


    metroNIDAZOLE 500mg/ 100 





    100mL 500 mg In 100 ml @   





    100 mls/hr IV Q12HR [facility]   





    Rx#:[id number]   


 


  TPN/PPN  600 


 


Other:   


 


  # Voids 3 2 


 


  # Bowel Movements  0 


 


  Weight Source Bedscale Bedscale 











Active Medications: 


Current Medications





Acetaminophen (Tylenol)  650 mg PO Q6H PRN


   PRN Reason: Pain (Mild)


   Stop: 18 02:01


Acetaminophen/Hydrocodone Bitart (Norco 5mg/325mg)  1 tab PO Q6H [facility]


   Stop: 18 21:29


   Last Admin: 18 15:35 Dose:  Not Given


Albuterol Sulfate (Albuterol 2.5mg/3ml Neb Ud)  1.25 mg HHN Q2HRT PRN


   PRN Reason: Shortness of Breath


   Stop: 18 02:12


   Last Admin: 18 07:39 Dose:  1.25 mg


Ascorbic Acid (Vitamin C)  500 mg PO DAILY [facility]


   Stop: 18 08:59


   Last Admin: 18 08:25 Dose:  Not Given


Atorvastatin Calcium (Lipitor)  10 mg PO DAILY [facility]; Protocol


   Stop: 18 08:59


   Last Admin: 18 08:26 Dose:  Not Given


Carvedilol (Coreg)  6.25 mg PO BID [facility]


   Stop: 18 16:59


   Last Admin: 18 16:11 Dose:  Not Given


Clopidogrel Bisulfate (Plavix)  75 mg PO DAILY [facility]


   Stop: 18 08:59


   Last Admin: 18 08:25 Dose:  Not Given


Diltiazem HCl (Cardizem)  60 mg PO Q6HR [facility]


   Stop: 18 17:59


   Last Admin: 18 18:46 Dose:  Not Given


Diltiazem HCl (Cardizem)  5 mg IVP Q6H PRN


   PRN Reason: HR more than 125bpm


   Stop: 18 03:29


Docusate Sodium (Colace)  100 mg PO BID [facility]


   Stop: 18 08:59


   Last Admin: 18 17:01 Dose:  Not Given


Donepezil HCl (Aricept)  10 mg PO HS [facility]


   Stop: 18 21:59


   Last Admin: 18 20:22 Dose:  Not Given


Enoxaparin Sodium 80 mg/ (Enoxaparin Sodium 40 mg)  120 mg SUBQ Q24H [facility]


   Stop: 18 15:59


   Last Admin: 18 15:51 Dose:  120 mg


Furosemide (Lasix)  40 mg IVP BID [facility]


   Stop: 18 16:59


   Last Admin: 18 16:11 Dose:  40 mg


Gabapentin (Neurontin)  600 mg PO Q6HR [facility]


   Stop: 18 05:59


   Last Admin: 18 17:01 Dose:  Not Given


Hydromorphone HCl (Dilaudid)  1 mg IVP Q6H PRN


   PRN Reason: Pain (Moderate)


   Stop: 18 16:38


   Last Admin: 18 10:59 Dose:  1 mg


Hydromorphone HCl (Dilaudid)  2 mg IVP Q6H PRN


   PRN Reason: SEVERE PAIN


   Stop: 18 21:10


   Last Admin: 18 15:20 Dose:  2 mg


Levofloxacin (Levaquin Pb)  250 mg in 50 mls @ 50 mls/hr IV Q24HR [facility]


   Stop: 18 00:59


   Last Admin: 18 00:11 Dose:  50 mls/hr


Metronidazole (Flagyl)  500 mg in 100 mls @ 100 mls/hr IV Q12HR [facility]


   Stop: 18 20:59


   Last Admin: 18 20:00 Dose:  100 mls/hr


Amikacin Sulfate 375 mg/ (Dextrose)  101.5 mls @ 100 mls/hr IV Q48HR [facility]


   Stop: 18 08:59


   Last Infusion: 18 11:05 Dose:  Infused


Multivitamins/Minerals 10 ml/Dextrose/ Amino Acids/Electrolytes  1,200 mls @ 50 

mls/hr IV .Q24H [facility]


   Stop: 18 16:59


   Last Admin: 18 16:11 Dose:  50 mls/hr


Insulin Aspart (Novolog Insulin Sliding Scale)  0 units SUBQ Q6HR [facility]; Protocol


   Stop: 18 17:59


   Last Admin: 18 18:46 Dose:  Not Given


Levothyroxine Sodium (Synthroid)  0.025 mg PO DAILY@0730 [facility]


   Stop: 18 07:29


   Last Admin: 18 06:38 Dose:  Not Given


Lorazepam (Ativan)  0.5 mg IVP Q6HR [facility]; Protocol


   PRN Reason: Agitation


   Stop: 18 11:03


   Last Admin: 18 19:58 Dose:  0.5 mg


Miscellaneous (Amikacin Iv Per Pharmacy)  1 [facility] PRN PRN


   PRN Reason: Protocol


   Stop: 18 15:52


Miscellaneous (Lovenox Subq Per Pharmacy)  1 [facility] PRN PRN


   PRN Reason: PROTOCOL


   Stop: 18 11:40


Miscellaneous (Ppn Per Pharmacy)  1 [facility] PRN PRN


   PRN Reason: PROTOCOL


   Stop: 18 10:04


Montelukast Sodium (Singulair)  10 mg PO DAILY [facility]


   Stop: 18 08:59


   Last Admin: 18 08:25 Dose:  Not Given


Morphine Sulfate (Morphine)  1 mg IVP Q4HR PRN


   PRN Reason: Moderate Pain


   Stop: 18 21:35


   Last Admin: 18 03:48 Dose:  1 mg


Mupirocin (Bactroban Oint)  1 appl NS BID [facility]


   Stop: 18 09:01


   Last Admin: 18 16:11 Dose:  1 appl


Ondansetron HCl (Zofran)  4 mg IV Q6H PRN


   PRN Reason: Nausea / Vomiting


   Stop: 18 21:34


   Last Admin: 18 16:26 Dose:  4 mg


Pantoprazole Sodium (Protonix)  40 mg IVP DAILY [facility]


   Stop: 18 08:59


   Last Admin: 18 08:27 Dose:  40 mg


Prednisone (Deltasone)  5 mg PO DAILY [facility]


   Stop: 18 08:59


   Last Admin: 18 08:25 Dose:  Not Given








General: Mild distress (confused)


Neck: Supple


Cardiovascular: Regular rate


Lungs: Clear to auscultation


Abdomen: Bowel sounds, Soft, Tender (LLQ), Distended (MILD)





Assessment/Plan





- Assessment


Assessment: 





abdominal pain


gastroenteritis


cad


copd 


pud


gerd


dementia








- Plan


Plan: 





iv vanco as per id


cbc/bmp in am


continue the rest of the orders 





Nutritional Asmnt/Malnutr-PDOC





- Dietary Evaluation


Malnutrition Findings (Please click <Entered> for more info): 








Nutritional Asmnt/Malnutrition                             Start:  18 14:

47


Text:                                                      Status: Complete    

  


Freq:                                                                          

  


Protocol:                                                                      

  


 Document     18 14:47  DIMITRIOS  (Rec: 18 15:32  [id number])


 Nutritional Asmnt/Malnutrition


     Patient General Information


      Nutritional Screening                      High Risk


      Diagnosis                                  elevated troponin, hypoxia,


                                                 possible PNA


      Pertinent Medical Hx/Surgical Hx           HTN, CAD, asthma/COPD, PUD/


                                                 GERD, ESRD, dementia


      Subjective Information                     Pt seen on mask at time of


                                                 visit. Pt was NPO for CT


                                                 scheduled today, no PO intake


                                                 information available.


      Current Diet Order/ Nutrition Support      NPO


      Pertinent Medications                      vit C, colace, levaquin,


                                                 synthorid, zofran, nacl 0.9%,


                                                 piperacillin, protonix


      Pertinent Labs                              Na 135, Glucose 190, A1c


                                                 5.1


     Nutritional Hx/Data


      Height                                     1.63 m


      Height (Calculated Centimeters)            162.6


      Current Weight (lbs)                       73.936 kg


      Weight (Calculated Kilograms)              73.9


      Weight (Calculated Grams)                  01679.6


      Ideal Body Weight                          120


      Body Mass Index (BMI)                      27.9


      Weight Status                              Overweight


     GI Symptoms


      GI Symptoms                                None


      Last BM                                    none


      Difficult in:                              None


      Skin Integrity/Comment:                    reddened to coccyx, buttocks


     Estimated Nutritional Goals


      BEE in Kcals:                              Using Current wt


      Calories/Kcals/Kg                          23-27


      Kcals Calculated                           4560-9791


      Protein g/k


      Protein Calculated                         74


      Fluid: ml                                  1702-1998ml (1ml/kcal)


     Nutritional Problem


      1. Problem


       Problem                                   altered nutrition related labs


       Etiology                                  hyperglycemia


       Signs/Symptoms:                           glucose 190 at admission


     Malnutrition Alert


      Is there a minimum of two criteria         No


       selected?                                 


       Query Text:Check all the applicable       


       criteria. A minimum of two criteria are   


       recommended for diagnosis of either       


       severe or non-severe malnutrition.        


     Malnutrition Related to Morbid Obesity


      Malnutrition related to morbid obesity     No


     Intervention/Recommendation


      Comments                                   1. Resume pureed JOJO diet


                                                 after CT exam. Assist pt with


                                                 meals. If glucose continue


                                                 high, will consider adding


                                                 CCHO diet.


                                                 2. Monitor PO intake, wt, labs


                                                 and skin integrity


                                                 3. F/U as high risk in 2-3


                                                 days, -


     Expected Outcomes/Goals


      Expected Outcomes/Goals                    1. PO intake to meet at least


                                                 75% of nutritional needs.


                                                 2. Wt stability, skin to


                                                 remain intact, labs to


                                                 approach WNL.

## 2018-07-03 NOTE — GI PROGRESS NOTE
Subjective





- Review of Systems


Subjective: 





NO EVENTS





Objective





- Results


Result Diagrams: 


 06/30/18 09:59





 07/03/18 05:50


Recent Labs: 


 Laboratory Last Values











WBC  10.8 Th/cmm (4.8-10.8)   06/30/18  09:59    


 


RBC  4.07 Mil/cmm (3.80-5.20)   06/30/18  09:59    


 


Hgb  12.6 gm/dL (12-16)   06/30/18  09:59    


 


Hct  37.3 % (41.0-60)  L  06/30/18  09:59    


 


MCV  91.9 fl ()   06/30/18  09:59    


 


MCH  31.1 pg (27.0-31.0)  H  06/30/18  09:59    


 


MCHC Differential  33.8 pg (28.0-36.0)   06/30/18  09:59    


 


RDW  14.3 % (11.5-20.0)   06/30/18  09:59    


 


Plt Count  199 Th/cmm (150-400)   06/30/18  09:59    


 


MPV  7.7 fl  06/30/18  09:59    


 


Neutrophils %  78.3 % (40.0-80.0)   06/30/18  09:59    


 


Lymphocytes %  15.6 % (20.0-50.0)  L  06/30/18  09:59    


 


Monocytes %  5.9 % (2.0-10.0)   06/30/18  09:59    


 


Eosinophils %  0.1 % (0.0-5.0)   06/30/18  09:59    


 


Basophils %  0.1 % (0.0-2.0)   06/30/18  09:59    


 


PT  15.5 SECONDS (9.5-11.5)  H  06/30/18  11:49    


 


INR  1.46  (0.5-1.4)  H  06/30/18  11:49    


 


PTT (Actin FS)  34.3 SECONDS (26.0-38.0)   06/30/18  11:49    


 


Specimen Source  arterial   06/27/18  18:58    


 


Sample Site  rr   06/27/18  18:58    


 


pH  7.38  (7.35-7.45)   06/27/18  18:58    


 


pCO2  41.0 mmHg (35.0-45.0)   06/27/18  18:58    


 


pO2  44.0 mmHg (80.0-100.0)  L*  06/27/18  18:58    


 


HCO3  23.9 mEq/L (20.0-26.0)   06/27/18  18:58    


 


Base Excess  -0.8 mEq/L (-3.0-3.0)   06/27/18  18:58    


 


O2 Saturation  79.0 % (92.0-100.0)  L  06/27/18  18:58    


 


[place]dal[place] Test  y   06/27/18  18:58    


 


Vent Rate  N/A   06/27/18  18:58    


 


Inspired O2  36   06/27/18  18:58    


 


Tidal Volume  N/A   06/27/18  18:58    


 


PEEP  N/A   06/27/18  18:58    


 


Pressure (ins/psv/peep)  N/A   06/27/18  18:58    


 


Critical Value  MM/JY   06/27/18  18:58    


 


Sodium  142 mEq/L (136-145)   07/03/18  05:50    


 


Potassium  4.5 mEq/L (3.5-5.1)   07/03/18  05:50    


 


Chloride  118 mEq/L ()  H  07/03/18  05:50    


 


Carbon Dioxide  17.4 mEq/L (21.0-31.0)  L  07/03/18  05:50    


 


Anion Gap  11.1  (7.0-16.0)   07/03/18  05:50    


 


BUN  43 mg/dL (7-25)  H  07/03/18  05:50    


 


Creatinine  1.9 mg/dL (0.6-1.2)  H  07/03/18  05:50    


 


Est GFR ( Amer)  TNP   07/03/18  05:50    


 


Est GFR (Non-Af Amer)  [facility]   07/03/18  05:50    


 


BUN/Creatinine Ratio  22.6   07/03/18  05:50    


 


Glucose  128 mg/dL ()  H  07/03/18  05:50    


 


POC Glucose  132 MG/DL (70 - 105)  H  07/02/18  18:13    


 


Hemoglobin A1c %  5.1 % (4.0-6.0)   06/27/18  18:40    


 


Calcium  7.6 mg/dL (8.6-10.3)  L  07/03/18  05:50    


 


Phosphorus  1.2 mg/dL (2.5-5.0)  L  07/03/18  05:50    


 


Magnesium  2.0 mg/dL (1.9-2.7)   07/03/18  05:50    


 


Total Bilirubin  0.6 mg/dL (0.3-1.0)   07/03/18  05:50    


 


Direct Bilirubin  0.24 mg/dL (0.0-0.2)  H  07/02/18  04:30    


 


AST  190 U/L (13-39)  H  07/03/18  05:50    


 


ALT  461 U/L (7-52)  H  07/03/18  05:50    


 


Alkaline Phosphatase  53 U/L ()   07/03/18  05:50    


 


Creatine Kinase  21 U/L ()  L  06/27/18  18:40    


 


Troponin I  5.84 ng/mL (0.01-0.05)  H* D 07/01/18  05:00    


 


B-Natriuretic Peptide  > 5000.0 pg/mL (5.0-100.0)  H  07/03/18  05:50    


 


Total Protein  5.1 gm/dL (6.0-8.3)  L  07/03/18  05:50    


 


Albumin  2.3 gm/dL (3.7-5.3)  L  07/03/18  05:50    


 


Globulin  2.8 gm/dL  07/03/18  05:50    


 


Albumin/Globulin Ratio  0.8  (1.0-1.8)  L  07/03/18  05:50    


 


Triglycerides  149 mg/dL (<150)   06/30/18  12:37    


 


Cholesterol  84 mg/dL (<200)   06/30/18  12:37    


 


LDL Cholesterol Direct  47 mg/dL ()  L  06/30/18  12:37    


 


HDL Cholesterol  18 mg/dL (23-92)  L  06/30/18  12:37    


 


Amylase  12 U/L ()  L  06/27/18  18:40    


 


Lipase  6 U/L (11-82)  L  06/27/18  18:40    


 


TSH  1.72 uIU/ml (0.34-5.60)   06/30/18  09:59    


 


Urine Source  CATH   06/28/18  05:05    


 


Urine Color  YELLOW   06/28/18  05:05    


 


Urine Clarity  TURBID  (CLEAR)  H  06/28/18  05:05    


 


Urine pH  5.5  (4.6 - 8.0)   06/28/18  05:05    


 


Ur Specific Gravity  1.020  (1.005-1.030)   06/28/18  05:05    


 


Urine Protein  TRACE mg/dL (NEGATIVE)   06/28/18  05:05    


 


Urine Glucose (UA)  NEGATIVE mg/dL (NEGATIVE)   06/28/18  05:05    


 


Urine Ketones  15 mg/dL (NEGATIVE)  H  06/28/18  05:05    


 


Urine Blood  NEGATIVE  (NEGATIVE)   06/28/18  05:05    


 


Urine Nitrate  POSITIVE  (NEGATIVE)  H  06/28/18  05:05    


 


Urine Bilirubin  NEGATIVE  (NEGATIVE)   06/28/18  05:05    


 


Urine Urobilinogen  1.0 E.U./dL (0.2 - 1.0)   06/28/18  05:05    


 


Ur Leukocyte Esterase  MODERATE  (NEGATIVE)  H  06/28/18  05:05    


 


Urine RBC  0-2 /hpf (0-5)   06/28/18  05:05    


 


Urine WBC   /hpf (0-5)  H  06/28/18  05:05    


 


Ur Epithelial Cells  MODERATE /lpf (FEW)   06/28/18  05:05    


 


Urine Bacteria  4+ /hpf (NONE SEEN)  H  06/28/18  05:05    


 


Acetaminophen  < 10.0 ug/mL (10.0-30.0)  L  07/02/18  04:30    














- Physical Exam


Vitals and I&O: 


 Vital Signs











Temp  98.2 F   07/03/18 06:00


 


Pulse  85   07/03/18 07:08


 


Resp  17   07/03/18 07:08


 


BP  134/43   07/03/18 06:00


 


Pulse Ox  95   07/03/18 07:08








 Intake & Output











 07/02/18 07/03/18 07/03/18





 18:59 06:59 18:59


 


Intake Total 2142.5 1100 


 


Balance 2142.5 1100 


 


Weight (lbs) 79.832 kg 80.286 kg 


 


Intake:   


 


  Intake, IV Amount 1542.5 1100 


 


    Amino Acids 3% / 855 1000 





    Electrolytes 1,000 ml @   





    50 mls/hr IV .Q20H [facility] Rx   





    #:[id number]   


 


    D5-0.9%Ns 1,000 ml @ 75 587.5  





    mls/hr IV .[id number] [facility] Rx   





    #:[id number]   


 


    metroNIDAZOLE 500mg/ 100 





    100mL 500 mg In 100 ml @   





    100 mls/hr IV Q12HR [facility]   





    Rx#:[id number]   


 


  TPN/  


 


Other:   


 


  # Voids 2 3 


 


  # Bowel Movements 0  


 


  Weight Source Bedscale Bedscale 











Active Medications: 


Current Medications





Acetaminophen (Tylenol)  650 mg PO Q6H PRN


   PRN Reason: Pain (Mild)


   Stop: 08/27/18 02:01


Acetaminophen/Hydrocodone Bitart (Norco 5mg/325mg)  1 tab PO Q6H [facility]


   Stop: 08/26/18 21:29


   Last Admin: 07/03/18 05:05 Dose:  Not Given


Albuterol Sulfate (Albuterol 2.5mg/3ml Neb Ud)  1.25 mg HHN Q2HRT PRN


   PRN Reason: Shortness of Breath


   Stop: 08/27/18 02:12


   Last Admin: 07/01/18 19:19 Dose:  1.25 mg


Ascorbic Acid (Vitamin C)  500 mg PO DAILY [facility]


   Stop: 08/27/18 08:59


   Last Admin: 07/02/18 08:36 Dose:  Not Given


Atorvastatin Calcium (Lipitor)  10 mg PO DAILY [facility]; Protocol


   Stop: 08/30/18 08:59


   Last Admin: 07/02/18 08:37 Dose:  Not Given


Carvedilol (Coreg)  6.25 mg PO BID [facility]


   Stop: 08/28/18 16:59


   Last Admin: 07/02/18 16:30 Dose:  Not Given


Clopidogrel Bisulfate (Plavix)  75 mg PO DAILY [facility]


   Stop: 08/27/18 08:59


   Last Admin: 07/02/18 08:36 Dose:  Not Given


Diltiazem HCl (Cardizem)  60 mg PO Q6HR [facility]


   Stop: 08/28/18 17:59


   Last Admin: 07/03/18 06:08 Dose:  Not Given


Diltiazem HCl (Cardizem)  5 mg IVP Q6H PRN


   PRN Reason: HR more than 125bpm


   Stop: 08/30/18 03:29


Docusate Sodium (Colace)  100 mg PO BID [facility]


   Stop: 08/27/18 08:59


   Last Admin: 07/02/18 18:02 Dose:  Not Given


Donepezil HCl (Aricept)  10 mg PO HS [facility]


   Stop: 08/26/18 21:59


   Last Admin: 07/02/18 20:22 Dose:  Not Given


Enoxaparin Sodium (Lovenox)  80 mg SUBQ DAILY [facility]


   Stop: 08/30/18 08:59


   Last Admin: 07/02/18 08:37 Dose:  Not Given


Enoxaparin Sodium 80 mg/ (Enoxaparin Sodium 40 mg)  120 mg SUBQ DAILY@1400 [facility]


   Stop: 09/01/18 13:59


Gabapentin (Neurontin)  600 mg PO Q6HR [facility]


   Stop: 08/27/18 05:59


   Last Admin: 07/03/18 06:09 Dose:  Not Given


Hydromorphone HCl (Dilaudid)  1 mg IVP Q6H PRN


   PRN Reason: Pain (Moderate)


   Stop: 08/28/18 16:38


   Last Admin: 07/02/18 10:59 Dose:  1 mg


Hydromorphone HCl (Dilaudid)  2 mg IVP Q6H PRN


   PRN Reason: SEVERE PAIN


   Stop: 08/28/18 21:10


   Last Admin: 07/02/18 23:45 Dose:  2 mg


Levofloxacin (Levaquin Pb)  250 mg in 50 mls @ 50 mls/hr IV Q24HR [facility]


   Stop: 08/27/18 00:59


   Last Admin: 07/03/18 00:11 Dose:  50 mls/hr


Metronidazole (Flagyl)  500 mg in 100 mls @ 100 mls/hr IV Q12HR [facility]


   Stop: 08/29/18 20:59


   Last Infusion: 07/02/18 22:06 Dose:  Infused


Amikacin Sulfate 375 mg/ (Dextrose)  101.5 mls @ 100 mls/hr IV Q48HR [facility]


   Stop: 08/30/18 08:59


   Last Infusion: 07/01/18 09:45 Dose:  Infused


Amino Acids/Electrolytes (Procalamine)  1,000 mls @ 50 mls/hr IV .Q20H [facility]


   Stop: 08/30/18 15:59


   Last Admin: 07/03/18 05:52 Dose:  50 mls/hr


Dextrose/Sodium Chloride (D5-0.9%Ns)  1,000 mls @ 50 mls/hr IV .Q20H [facility]


   Stop: 08/31/18 14:14


Insulin Aspart (Novolog Insulin Sliding Scale)  0 units SUBQ Q6HR [facility]; Protocol


   Stop: 08/30/18 17:59


   Last Admin: 07/03/18 06:09 Dose:  Not Given


Levothyroxine Sodium (Synthroid)  0.025 mg PO DAILY@0730 [facility]


   Stop: 08/27/18 07:29


   Last Admin: 07/03/18 06:38 Dose:  Not Given


Lorazepam (Ativan)  0.5 mg IVP Q6HR PRN; Protocol


   PRN Reason: Agitation


   Stop: 08/29/18 11:03


   Last Admin: 07/03/18 02:14 Dose:  0.5 mg


Miscellaneous (Amikacin Iv Per Pharmacy)  1 [facility] PRN PRN


   PRN Reason: Protocol


   Stop: 08/29/18 15:52


Miscellaneous (Lovenox Subq Per Pharmacy)  1 [facility] PRN PRN


   PRN Reason: PROTOCOL


   Stop: 08/29/18 11:40


Miscellaneous (Ppn Per Pharmacy)  1 [facility] PRN PRN


   PRN Reason: PROTOCOL


   Stop: 08/30/18 10:04


Montelukast Sodium (Singulair)  10 mg PO DAILY [facility]


   Stop: 08/27/18 08:59


   Last Admin: 07/02/18 08:37 Dose:  Not Given


Morphine Sulfate (Morphine)  1 mg IVP Q4HR PRN


   PRN Reason: Moderate Pain


   Stop: 08/26/18 21:35


   Last Admin: 07/02/18 03:48 Dose:  1 mg


Mupirocin (Bactroban Oint)  1 appl NS BID [facility]


   Stop: 07/04/18 09:01


   Last Admin: 07/02/18 18:14 Dose:  1 appl


Ondansetron HCl (Zofran)  4 mg IV Q6H PRN


   PRN Reason: Nausea / Vomiting


   Stop: 08/26/18 21:34


   Last Admin: 06/29/18 16:26 Dose:  4 mg


Pantoprazole Sodium (Protonix)  40 mg IVP DAILY [facility]


   Stop: 08/30/18 08:59


   Last Admin: 07/02/18 09:05 Dose:  40 mg


Prednisone (Deltasone)  5 mg PO DAILY [facility]


   Stop: 08/27/18 08:59


   Last Admin: 07/02/18 08:37 Dose:  Not Given








General: Mild distress (confused)


Neck: Supple


Cardiovascular: Regular rate


Lungs: Clear to auscultation


Abdomen: Bowel sounds, Soft, Tender (LLQ), Distended (MILD)





Assessment/Plan





- Assessment


Assessment: 





86 YO FEMALE WITH SIGMOID DIVERTICULITIS.  MAY ALSO HAVE ISCHEMIC COLITIS.


ALSO WITH CHOLELITHIASIS AND ELEVATED TRANSAMINASES - R/O ISCHEMIC HEPATITIS 

VS. CONGESTION FROM CHF.


 


1. ABX.


2. NPO AND TPN.


3. PER CARDS TO OPTIMIZE CARDIAC FUNCTION.


4. FAILED SWALLOW EVAL.  POLST OUTLINED WISH FOR DNR AND NO ARTIFICIAL FEEDING.


5. CHECK HIDA

## 2018-07-04 LAB
ALBUMIN SERPL-MCNC: 2.4 GM/DL (ref 3.7–5.3)
ALBUMIN/GLOB SERPL: 0.9 {RATIO} (ref 1–1.8)
ALP SERPL-CCNC: 47 U/L (ref 34–104)
ALT SERPL-CCNC: 287 U/L (ref 7–52)
ANION GAP SERPL CALC-SCNC: 11.8 MMOL/L (ref 7–16)
AST SERPL-CCNC: 59 U/L (ref 13–39)
BILIRUB SERPL-MCNC: 0.6 MG/DL (ref 0.3–1)
BUN SERPL-MCNC: 46 MG/DL (ref 7–25)
CALCIUM SERPL-MCNC: 7.5 MG/DL (ref 8.6–10.3)
CHLORIDE SERPL-SCNC: 118 MEQ/L (ref 98–107)
CO2 SERPL-SCNC: 16.3 MEQ/L (ref 21–31)
CREAT SERPL-MCNC: 1.9 MG/DL (ref 0.6–1.2)
GLOBULIN SER-MCNC: 2.8 GM/DL
GLUCOSE SERPL-MCNC: 143 MG/DL (ref 70–105)
MAGNESIUM SERPL-MCNC: 2 MG/DL (ref 1.9–2.7)
PHOSPHATE SERPL-MCNC: 4.2 MG/DL (ref 2.5–5)
POTASSIUM SERPL-SCNC: 4.1 MEQ/L (ref 3.5–5.1)
SODIUM SERPL-SCNC: 142 MEQ/L (ref 136–145)

## 2018-07-04 RX ADMIN — DILTIAZEM HYDROCHLORIDE SCH: 30 TABLET, FILM COATED ORAL at 00:05

## 2018-07-04 RX ADMIN — INSULIN ASPART SCH: 100 INJECTION, SOLUTION INTRAVENOUS; SUBCUTANEOUS at 00:19

## 2018-07-04 RX ADMIN — METRONIDAZOLE SCH MLS/HR: 500 INJECTION, SOLUTION INTRAVENOUS at 08:49

## 2018-07-04 RX ADMIN — DILTIAZEM HYDROCHLORIDE SCH: 30 TABLET, FILM COATED ORAL at 11:16

## 2018-07-04 RX ADMIN — INSULIN ASPART SCH: 100 INJECTION, SOLUTION INTRAVENOUS; SUBCUTANEOUS at 06:14

## 2018-07-04 RX ADMIN — LEVOFLOXACIN SCH MLS/HR: 5 INJECTION INTRAVENOUS at 00:11

## 2018-07-04 RX ADMIN — DILTIAZEM HYDROCHLORIDE SCH: 30 TABLET, FILM COATED ORAL at 06:13

## 2018-07-04 RX ADMIN — HYDROMORPHONE HYDROCHLORIDE PRN MG: 2 INJECTION INTRAMUSCULAR; INTRAVENOUS; SUBCUTANEOUS at 03:36

## 2018-07-04 RX ADMIN — HYDROCODONE BITARTRATE AND ACETAMINOPHEN SCH: 5; 325 TABLET ORAL at 09:30

## 2018-07-04 RX ADMIN — HYDROCODONE BITARTRATE AND ACETAMINOPHEN SCH: 5; 325 TABLET ORAL at 16:21

## 2018-07-04 RX ADMIN — LEVOTHYROXINE SODIUM SCH: 100 TABLET ORAL at 06:32

## 2018-07-04 RX ADMIN — HYDROMORPHONE HYDROCHLORIDE PRN MG: 2 INJECTION INTRAMUSCULAR; INTRAVENOUS; SUBCUTANEOUS at 16:15

## 2018-07-04 RX ADMIN — HYDROCODONE BITARTRATE AND ACETAMINOPHEN SCH: 5; 325 TABLET ORAL at 21:30

## 2018-07-04 RX ADMIN — HYDROCODONE BITARTRATE AND ACETAMINOPHEN SCH: 5; 325 TABLET ORAL at 03:22

## 2018-07-04 RX ADMIN — INSULIN ASPART SCH: 100 INJECTION, SOLUTION INTRAVENOUS; SUBCUTANEOUS at 11:15

## 2018-07-04 RX ADMIN — ASCORBIC ACID, VITAMIN A PALMITATE, CHOLECALCIFEROL, THIAMINE HYDROCHLORIDE, RIBOFLAVIN-5 PHOSPHATE SODIUM, PYRIDOXINE HYDROCHLORIDE, NIACINAMIDE, DEXPANTHENOL, ALPHA-TOCOPHEROL ACETATE, VITAMIN K1, FOLIC ACID, BIOTIN, CYANOCOBALAMIN SCH MLS/HR: 200; 3300; 200; 6; 3.6; 6; 40; 15; 10; 150; 600; 60; 5 INJECTION, SOLUTION INTRAVENOUS at 16:16

## 2018-07-04 RX ADMIN — INSULIN ASPART SCH: 100 INJECTION, SOLUTION INTRAVENOUS; SUBCUTANEOUS at 18:28

## 2018-07-04 RX ADMIN — HYDROMORPHONE HYDROCHLORIDE PRN MG: 2 INJECTION INTRAMUSCULAR; INTRAVENOUS; SUBCUTANEOUS at 10:49

## 2018-07-04 RX ADMIN — DILTIAZEM HYDROCHLORIDE SCH: 30 TABLET, FILM COATED ORAL at 18:27

## 2018-07-04 RX ADMIN — METRONIDAZOLE SCH MLS/HR: 500 INJECTION, SOLUTION INTRAVENOUS at 21:14

## 2018-07-04 NOTE — GENERAL PROGRESS NOTE
Subjective





- Review of Systems


Service Date: 18


Events since last encounter: 





pt is confused


Subjective: 


awake, confused 


hidaz positive





Objective





- Results


Result Diagrams: 


 18 09:59





 18 05:10


Recent Labs: 


 Laboratory Last Values











WBC  10.8 Th/cmm (4.8-10.8)   18  09:59    


 


RBC  4.07 Mil/cmm (3.80-5.20)   18  09:59    


 


Hgb  12.6 gm/dL (12-16)   18  09:59    


 


Hct  37.3 % (41.0-60)  L  18  09:59    


 


MCV  91.9 fl ()   18  09:59    


 


MCH  31.1 pg (27.0-31.0)  H  18  09:59    


 


MCHC Differential  33.8 pg (28.0-36.0)   18  09:59    


 


RDW  14.3 % (11.5-20.0)   18  09:59    


 


Plt Count  199 Th/cmm (150-400)   18  09:59    


 


MPV  7.7 fl  18  09:59    


 


Neutrophils %  78.3 % (40.0-80.0)   18  09:59    


 


Lymphocytes %  15.6 % (20.0-50.0)  L  18  09:59    


 


Monocytes %  5.9 % (2.0-10.0)   18  09:59    


 


Eosinophils %  0.1 % (0.0-5.0)   18  09:59    


 


Basophils %  0.1 % (0.0-2.0)   18  09:59    


 


PT  15.5 SECONDS (9.5-11.5)  H  18  11:49    


 


INR  1.46  (0.5-1.4)  H  18  11:49    


 


PTT (Actin FS)  34.3 SECONDS (26.0-38.0)   18  11:49    


 


Specimen Source  arterial   18  18:58    


 


Sample Site  rr   18  18:58    


 


pH  7.38  (7.35-7.45)   18  18:58    


 


pCO2  41.0 mmHg (35.0-45.0)   18  18:58    


 


pO2  44.0 mmHg (80.0-100.0)  L*  18  18:58    


 


HCO3  23.9 mEq/L (20.0-26.0)   18  18:58    


 


Base Excess  -0.8 mEq/L (-3.0-3.0)   18  18:58    


 


O2 Saturation  79.0 % (92.0-100.0)  L  18  18:58    


 


Magdaleno Test  y   18  18:58    


 


Vent Rate  N/A   18  18:58    


 


Inspired O2  36   18  18:58    


 


Tidal Volume  N/A   18  18:58    


 


PEEP  N/A   18  18:58    


 


Pressure (ins/psv/peep)  N/A   18  18:58    


 


Critical Value  MM/JY   18  18:58    


 


Sodium  142 mEq/L (136-145)   18  05:10    


 


Potassium  4.1 mEq/L (3.5-5.1)   18  05:10    


 


Chloride  118 mEq/L ()  H  18  05:10    


 


Carbon Dioxide  16.3 mEq/L (21.0-31.0)  L  18  05:10    


 


Anion Gap  11.8  (7.0-16.0)   18  05:10    


 


BUN  46 mg/dL (7-25)  H  18  05:10    


 


Creatinine  1.9 mg/dL (0.6-1.2)  H  18  05:10    


 


Est GFR ( Amer)  TNP   18  05:10    


 


Est GFR (Non-Af Amer)  TNP   18  05:10    


 


BUN/Creatinine Ratio  24.2   18  05:10    


 


Glucose  143 mg/dL ()  H  18  05:10    


 


POC Glucose  135 MG/DL (70 - 105)  H  18  18:25    


 


Hemoglobin A1c %  5.1 % (4.0-6.0)   18  18:40    


 


Calcium  7.5 mg/dL (8.6-10.3)  L  18  05:10    


 


Phosphorus  4.2 mg/dL (2.5-5.0)   18  05:10    


 


Magnesium  2.0 mg/dL (1.9-2.7)   18  05:10    


 


Total Bilirubin  0.6 mg/dL (0.3-1.0)   18  05:10    


 


Direct Bilirubin  0.24 mg/dL (0.0-0.2)  H  18  04:30    


 


AST  59 U/L (13-39)  H  18  05:10    


 


ALT  287 U/L (7-52)  H  18  05:10    


 


Alkaline Phosphatase  47 U/L ()   18  05:10    


 


Creatine Kinase  21 U/L ()  L  18  18:40    


 


Troponin I  0.80 ng/mL (0.01-0.05)  H* D 18  05:50    


 


B-Natriuretic Peptide  > 5000.0 pg/mL (5.0-100.0)  H  18  05:10    


 


Total Protein  5.2 gm/dL (6.0-8.3)  L  18  05:10    


 


Albumin  2.4 gm/dL (3.7-5.3)  L  18  05:10    


 


Globulin  2.8 gm/dL  18  05:10    


 


Albumin/Globulin Ratio  0.9  (1.0-1.8)  L  18  05:10    


 


Triglycerides  149 mg/dL (<150)   18  12:37    


 


Cholesterol  84 mg/dL (<200)   18  12:37    


 


LDL Cholesterol Direct  47 mg/dL ()  L  18  12:37    


 


HDL Cholesterol  18 mg/dL (23-92)  L  18  12:37    


 


Amylase  12 U/L ()  L  18  18:40    


 


Lipase  6 U/L (11-82)  L  18  18:40    


 


TSH  1.72 uIU/ml (0.34-5.60)   18  09:59    


 


Urine Source  CATH   18  05:05    


 


Urine Color  YELLOW   18  05:05    


 


Urine Clarity  TURBID  (CLEAR)  H  18  05:05    


 


Urine pH  5.5  (4.6 - 8.0)   18  05:05    


 


Ur Specific Gravity  1.020  (1.005-1.030)   18  05:05    


 


Urine Protein  TRACE mg/dL (NEGATIVE)   18  05:05    


 


Urine Glucose (UA)  NEGATIVE mg/dL (NEGATIVE)   18  05:05    


 


Urine Ketones  15 mg/dL (NEGATIVE)  H  18  05:05    


 


Urine Blood  NEGATIVE  (NEGATIVE)   18  05:05    


 


Urine Nitrate  POSITIVE  (NEGATIVE)  H  18  05:05    


 


Urine Bilirubin  NEGATIVE  (NEGATIVE)   18  05:05    


 


Urine Urobilinogen  1.0 E.U./dL (0.2 - 1.0)   18  05:05    


 


Ur Leukocyte Esterase  MODERATE  (NEGATIVE)  H  18  05:05    


 


Urine RBC  0-2 /hpf (0-5)   18  05:05    


 


Urine WBC   /hpf (0-5)  H  18  05:05    


 


Ur Epithelial Cells  MODERATE /lpf (FEW)   18  05:05    


 


Urine Bacteria  4+ /hpf (NONE SEEN)  H  18  05:05    


 


Acetaminophen  < 10.0 ug/mL (10.0-30.0)  L  18  04:30    














- Physical Exam


Vitals and I&O: 


 Vital Signs











Temp  98.4 F   18 06:00


 


Pulse  95   18 08:59


 


Resp  22   18 08:18


 


BP  107/33   18 09:00


 


Pulse Ox  96   18 07:50








 Intake & Output











 18





 18:59 06:59 18:59


 


Intake Total 801.5 363.3333 


 


Balance 801.5 363.3333 


 


Weight (lbs) 80.286 kg 73.482 kg 


 


Intake:   


 


  Intake, IV Amount 201.5 363.3333 


 


    Amikacin 375 mg In 101.5  





    Dextrose 5% 100 ml @ 100   





    mls/hr IV Q48HR OMAR Rx#:   





    [id number]   


 


    metroNIDAZOLE 500mg/ 100 





    100mL 500 mg In 100 ml @   





    100 mls/hr IV Q12HR OMAR   





    Rx#:[id number]   


 


  TPN/  


 


Other:   


 


  # Voids 2 4 


 


  # Bowel Movements 0 1 


 


  Stool Characteristics  Soft 





  Formed 





  Brown 


 


  Weight Source Bedscale Bedscale 











Active Medications: 


Current Medications





Acetaminophen (Tylenol)  650 mg PO Q6H PRN


   PRN Reason: Pain (Mild)


   Stop: 18 02:01


Acetaminophen/Hydrocodone Bitart (Norco 5mg/325mg)  1 tab PO Q6H Count includes the [facility]


   Stop: 18 21:29


   Last Admin: 18 03:22 Dose:  Not Given


Albuterol Sulfate (Albuterol 2.5mg/3ml Neb Ud)  1.25 mg HHN Q2HRT PRN


   PRN Reason: Shortness of Breath


   Stop: 18 02:12


   Last Admin: 18 07:39 Dose:  1.25 mg


Ascorbic Acid (Vitamin C)  500 mg PO DAILY Count includes the [facility]


   Stop: 18 08:59


   Last Admin: 18 08:59 Dose:  Not Given


Atorvastatin Calcium (Lipitor)  10 mg PO DAILY Count includes the [facility]; Protocol


   Stop: 18 08:59


   Last Admin: 18 08:59 Dose:  Not Given


Carvedilol (Coreg)  6.25 mg PO BID Count includes the [facility]


   Stop: 18 16:59


   Last Admin: 18 08:59 Dose:  Not Given


Clopidogrel Bisulfate (Plavix)  75 mg PO DAILY Count includes the [facility]


   Stop: 18 08:59


   Last Admin: 18 09:00 Dose:  Not Given


Diltiazem HCl (Cardizem)  60 mg PO Q6HR Count includes the [facility]


   Stop: 18 17:59


   Last Admin: 18 06:13 Dose:  Not Given


Diltiazem HCl (Cardizem)  5 mg IVP Q6H PRN


   PRN Reason: HR more than 125bpm


   Stop: 18 03:29


Docusate Sodium (Colace)  100 mg PO BID Count includes the [facility]


   Stop: 18 08:59


   Last Admin: 18 09:00 Dose:  Not Given


Donepezil HCl (Aricept)  10 mg PO HS Count includes the [facility]


   Stop: 18 21:59


   Last Admin: 18 21:15 Dose:  Not Given


Enoxaparin Sodium 80 mg/ (Enoxaparin Sodium 40 mg)  120 mg SUBQ Q24H Count includes the [facility]


   Stop: 18 15:59


   Last Admin: 18 15:51 Dose:  120 mg


Furosemide (Lasix)  40 mg IVP BID Count includes the [facility]


   Stop: 18 16:59


   Last Admin: 18 09:00 Dose:  Not Given


Gabapentin (Neurontin)  600 mg PO Q6HR Count includes the [facility]


   Stop: 18 05:59


   Last Admin: 18 06:14 Dose:  Not Given


Hydromorphone HCl (Dilaudid)  1 mg IVP Q6H PRN


   PRN Reason: Pain (Moderate)


   Stop: 18 16:38


   Last Admin: 18 10:59 Dose:  1 mg


Hydromorphone HCl (Dilaudid)  2 mg IVP Q6H PRN


   PRN Reason: SEVERE PAIN


   Stop: 18 21:10


   Last Admin: 18 03:36 Dose:  2 mg


Levofloxacin (Levaquin Pb)  250 mg in 50 mls @ 50 mls/hr IV Q24HR Count includes the [facility]


   Stop: 18 00:59


   Last Admin: 18 00:11 Dose:  50 mls/hr


Metronidazole (Flagyl)  500 mg in 100 mls @ 100 mls/hr IV Q12HR Count includes the [facility]


   Stop: 18 20:59


   Last Admin: 18 08:49 Dose:  100 mls/hr


Amikacin Sulfate 375 mg/ (Dextrose)  101.5 mls @ 100 mls/hr IV Q48HR Count includes the [facility]


   Stop: 18 08:59


   Last Infusion: 18 11:05 Dose:  Infused


Multivitamins/Minerals 10 ml/Dextrose/ Amino Acids/Electrolytes  1,200 mls @ 50 

mls/hr IV .Q24H Count includes the [facility]


   Stop: 18 16:59


   Last Admin: 18 16:11 Dose:  50 mls/hr


Insulin Aspart (Novolog Insulin Sliding Scale)  0 units SUBQ Q6HR Count includes the [facility]; Protocol


   Stop: 18 17:59


   Last Admin: 18 06:14 Dose:  Not Given


Levothyroxine Sodium (Synthroid)  0.025 mg PO DAILY@0730 Count includes the [facility]


   Stop: 18 07:29


   Last Admin: 18 06:32 Dose:  Not Given


Lorazepam (Ativan)  0.5 mg IVP Q6HR PRN; Protocol


   PRN Reason: Agitation


   Stop: 18 11:03


   Last Admin: 18 19:58 Dose:  0.5 mg


Miscellaneous (Amikacin Iv Per Pharmacy)  1 [facility] PRN PRN


   PRN Reason: Protocol


   Stop: 18 15:52


Miscellaneous (Lovenox Subq Per Pharmacy)  1 [facility] PRN PRN


   PRN Reason: PROTOCOL


   Stop: 18 11:40


Miscellaneous (Ppn Per Pharmacy)  1 [facility] PRN PRN


   PRN Reason: PROTOCOL


   Stop: 18 10:04


Montelukast Sodium (Singulair)  10 mg PO DAILY Count includes the [facility]


   Stop: 18 08:59


   Last Admin: 18 09:00 Dose:  Not Given


Morphine Sulfate (Morphine)  1 mg IVP Q4HR PRN


   PRN Reason: Moderate Pain


   Stop: 18 21:35


   Last Admin: 18 03:48 Dose:  1 mg


Ondansetron HCl (Zofran)  4 mg IV Q6H PRN


   PRN Reason: Nausea / Vomiting


   Stop: 18 21:34


   Last Admin: 18 16:26 Dose:  4 mg


Pantoprazole Sodium (Protonix)  40 mg IVP DAILY Count includes the [facility]


   Stop: 18 08:59


   Last Admin: 18 08:50 Dose:  40 mg


Prednisone (Deltasone)  5 mg PO DAILY Count includes the [facility]


   Stop: 18 08:59


   Last Admin: 18 09:01 Dose:  Not Given








General: Mild distress (confused)


Neck: Supple


Cardiovascular: Regular rate


Lungs: Clear to auscultation


Abdomen: Bowel sounds, Soft, Tender (LLQ), Distended (MILD)





Assessment/Plan





- Assessment


Assessment: 





abdominal pain


gastroenteritis


cad


copd 


pud


gerd


dementia








- Plan


Plan: 





iv vanco as per id


cbc/bmp in am


continue the rest of the orders 





Nutritional Asmnt/Malnutr-PDOC





- Dietary Evaluation


Malnutrition Findings (Please click <Entered> for more info): 








Nutritional Asmnt/Malnutrition                             Start:  18 14:

47


Text:                                                      Status: Complete    

  


Freq:                                                                          

  


Protocol:                                                                      

  


 Document     18 14:47  LCMARIANAG  (Rec: 18 15:32  LCMARIANAG  [id number])


 Nutritional Asmnt/Malnutrition


     Patient General Information


      Nutritional Screening                      High Risk


      Diagnosis                                  elevated troponin, hypoxia,


                                                 possible PNA


      Pertinent Medical Hx/Surgical Hx           HTN, CAD, asthma/COPD, PUD/


                                                 GERD, ESRD, dementia


      Subjective Information                     Pt seen on mask at time of


                                                 visit. Pt was NPO for CT


                                                 scheduled today, no PO intake


                                                 information available.


      Current Diet Order/ Nutrition Support      NPO


      Pertinent Medications                      vit C, colace, levaquin,


                                                 synthorid, zofran, nacl 0.9%,


                                                 piperacillin, protonix


      Pertinent Labs                              Na 135, Glucose 190, A1c


                                                 5.1


     Nutritional Hx/Data


      Height                                     1.63 m


      Height (Calculated Centimeters)            162.6


      Current Weight (lbs)                       73.936 kg


      Weight (Calculated Kilograms)              73.9


      Weight (Calculated Grams)                  62504.6


      Ideal Body Weight                          120


      Body Mass Index (BMI)                      27.9


      Weight Status                              Overweight


     GI Symptoms


      GI Symptoms                                None


      Last BM                                    none


      Difficult in:                              None


      Skin Integrity/Comment:                    reddened to coccyx, buttocks


     Estimated Nutritional Goals


      BEE in Kcals:                              Using Current wt


      Calories/Kcals/Kg                          23-27


      Kcals Calculated                           9606-4272


      Protein g/k


      Protein Calculated                         74


      Fluid: ml                                  1702-1998ml (1ml/kcal)


     Nutritional Problem


      1. Problem


       Problem                                   altered nutrition related labs


       Etiology                                  hyperglycemia


       Signs/Symptoms:                           glucose 190 at admission


     Malnutrition Alert


      Is there a minimum of two criteria         No


       selected?                                 


       Query Text:Check all the applicable       


       criteria. A minimum of two criteria are   


       recommended for diagnosis of either       


       severe or non-severe malnutrition.        


     Malnutrition Related to Morbid Obesity


      Malnutrition related to morbid obesity     No


     Intervention/Recommendation


      Comments                                   1. Resume pureed JOJO diet


                                                 after CT exam. Assist pt with


                                                 meals. If glucose continue


                                                 high, will consider adding


                                                 CCHO diet.


                                                 2. Monitor PO intake, wt, labs


                                                 and skin integrity


                                                 3. F/U as high risk in 2-3


                                                 days, -


     Expected Outcomes/Goals


      Expected Outcomes/Goals                    1. PO intake to meet at least


                                                 75% of nutritional needs.


                                                 2. Wt stability, skin to


                                                 remain intact, labs to


                                                 approach WNL.

## 2018-07-04 NOTE — CONSULTATION
DATE OF CONSULTATION:  07/04/2018



VASCULAR CONSULTATION



REFERRING PHYSICIAN:  Dr. Germain.



REASON FOR CONSULTATION:  Possible hemodialysis, placement of Paul catheter.



Thank you for referring this patient to me.



HISTORY OF PRESENT ILLNESS:  An 87-year-old female brought in because of nausea,

vomiting and abdominal pain.



PAST MEDICAL HISTORY:  Includes coronary artery disease, COPD, GERD, dementia,

asthma and hypotension.



LABORATORY STUDIES:  At this admission, the BUN and creatinine are elevated to

43 and 1.9 and blood sugar 128.  Troponins elevated to 0.8.  AST and ALT are

also elevated.  



The patient has been seen in consultation by GI, ID consultants as well as

Cardiology.



The patient's family has decided against any kind of intervention and wants only

comfort measures.  She is in the process of being transferred to a long-term

acute care facility.





DD: 07/04/2018 15:23

DT: 07/04/2018 17:02

JOB# [id number]  [id number]

## 2018-07-04 NOTE — DIAGNOSTIC IMAGING REPORT
Nuclear medicine HIDA scan



HISTORY: Gallstones, assess for possible cholecystitis



COMPARISON: CT abdomen and pelvis on 6/28/2018 ultrasound abdomen on

7-18



Technique/procedure: 5.3 mCi of technetium labeled Choletec was

administered intravenously and multiple scintigraphic images were

obtained for up to 1 hour.



FINDINGS: Prompt hepatic uptake is noted.  There is small bowel uptake

seen within the first hour.  Gallbladder uptake is equivocal is delayed

images were not obtained.



IMPRESSION:  Equivocal exam.  There may be delayed gallbladder uptake. 

Clinical correlation and possible repeat exam may be obtained for

further assessment if indicated.







Final results were administered to the referring team on 7/4/2018 at

10:15 AM.

## 2018-07-04 NOTE — GI PROGRESS NOTE
Subjective





- Review of Systems


Subjective: 





NO EVENTS





Objective





- Results


Result Diagrams: 


 06/30/18 09:59





 07/04/18 05:10


Recent Labs: 


 Laboratory Last Values











WBC  10.8 Th/cmm (4.8-10.8)   06/30/18  09:59    


 


RBC  4.07 Mil/cmm (3.80-5.20)   06/30/18  09:59    


 


Hgb  12.6 gm/dL (12-16)   06/30/18  09:59    


 


Hct  37.3 % (41.0-60)  L  06/30/18  09:59    


 


MCV  91.9 fl ()   06/30/18  09:59    


 


MCH  31.1 pg (27.0-31.0)  H  06/30/18  09:59    


 


MCHC Differential  33.8 pg (28.0-36.0)   06/30/18  09:59    


 


RDW  14.3 % (11.5-20.0)   06/30/18  09:59    


 


Plt Count  199 Th/cmm (150-400)   06/30/18  09:59    


 


MPV  7.7 fl  06/30/18  09:59    


 


Neutrophils %  78.3 % (40.0-80.0)   06/30/18  09:59    


 


Lymphocytes %  15.6 % (20.0-50.0)  L  06/30/18  09:59    


 


Monocytes %  5.9 % (2.0-10.0)   06/30/18  09:59    


 


Eosinophils %  0.1 % (0.0-5.0)   06/30/18  09:59    


 


Basophils %  0.1 % (0.0-2.0)   06/30/18  09:59    


 


PT  15.5 SECONDS (9.5-11.5)  H  06/30/18  11:49    


 


INR  1.46  (0.5-1.4)  H  06/30/18  11:49    


 


PTT (Actin FS)  34.3 SECONDS (26.0-38.0)   06/30/18  11:49    


 


Specimen Source  arterial   06/27/18  18:58    


 


Sample Site  rr   06/27/18  18:58    


 


pH  7.38  (7.35-7.45)   06/27/18  18:58    


 


pCO2  41.0 mmHg (35.0-45.0)   06/27/18  18:58    


 


pO2  44.0 mmHg (80.0-100.0)  L*  06/27/18  18:58    


 


HCO3  23.9 mEq/L (20.0-26.0)   06/27/18  18:58    


 


Base Excess  -0.8 mEq/L (-3.0-3.0)   06/27/18  18:58    


 


O2 Saturation  79.0 % (92.0-100.0)  L  06/27/18  18:58    


 


[place]dal[place] Test  y   06/27/18  18:58    


 


Vent Rate  N/A   06/27/18  18:58    


 


Inspired O2  36   06/27/18  18:58    


 


Tidal Volume  N/A   06/27/18  18:58    


 


PEEP  N/A   06/27/18  18:58    


 


Pressure (ins/psv/peep)  N/A   06/27/18  18:58    


 


Critical Value  MM/JY   06/27/18  18:58    


 


Sodium  142 mEq/L (136-145)   07/04/18  05:10    


 


Potassium  4.1 mEq/L (3.5-5.1)   07/04/18  05:10    


 


Chloride  118 mEq/L ()  H  07/04/18  05:10    


 


Carbon Dioxide  16.3 mEq/L (21.0-31.0)  L  07/04/18  05:10    


 


Anion Gap  11.8  (7.0-16.0)   07/04/18  05:10    


 


BUN  46 mg/dL (7-25)  H  07/04/18  05:10    


 


Creatinine  1.9 mg/dL (0.6-1.2)  H  07/04/18  05:10    


 


Est GFR ( Amer)  TNP   07/04/18  05:10    


 


Est GFR (Non-Af Amer)  [facility]   07/04/18  05:10    


 


BUN/Creatinine Ratio  24.2   07/04/18  05:10    


 


Glucose  143 mg/dL ()  H  07/04/18  05:10    


 


POC Glucose  135 MG/DL (70 - 105)  H  07/03/18  18:25    


 


Hemoglobin A1c %  5.1 % (4.0-6.0)   06/27/18  18:40    


 


Calcium  7.5 mg/dL (8.6-10.3)  L  07/04/18  05:10    


 


Phosphorus  4.2 mg/dL (2.5-5.0)   07/04/18  05:10    


 


Magnesium  2.0 mg/dL (1.9-2.7)   07/04/18  05:10    


 


Total Bilirubin  0.6 mg/dL (0.3-1.0)   07/04/18  05:10    


 


Direct Bilirubin  0.24 mg/dL (0.0-0.2)  H  07/02/18  04:30    


 


AST  59 U/L (13-39)  H  07/04/18  05:10    


 


ALT  287 U/L (7-52)  H  07/04/18  05:10    


 


Alkaline Phosphatase  47 U/L ()   07/04/18  05:10    


 


Creatine Kinase  21 U/L ()  L  06/27/18  18:40    


 


Troponin I  0.80 ng/mL (0.01-0.05)  H* D 07/03/18  05:50    


 


B-Natriuretic Peptide  > 5000.0 pg/mL (5.0-100.0)  H  07/04/18  05:10    


 


Total Protein  5.2 gm/dL (6.0-8.3)  L  07/04/18  05:10    


 


Albumin  2.4 gm/dL (3.7-5.3)  L  07/04/18  05:10    


 


Globulin  2.8 gm/dL  07/04/18  05:10    


 


Albumin/Globulin Ratio  0.9  (1.0-1.8)  L  07/04/18  05:10    


 


Triglycerides  149 mg/dL (<150)   06/30/18  12:37    


 


Cholesterol  84 mg/dL (<200)   06/30/18  12:37    


 


LDL Cholesterol Direct  47 mg/dL ()  L  06/30/18  12:37    


 


HDL Cholesterol  18 mg/dL (23-92)  L  06/30/18  12:37    


 


Amylase  12 U/L ()  L  06/27/18  18:40    


 


Lipase  6 U/L (11-82)  L  06/27/18  18:40    


 


TSH  1.72 uIU/ml (0.34-5.60)   06/30/18  09:59    


 


Urine Source  CATH   06/28/18  05:05    


 


Urine Color  YELLOW   06/28/18  05:05    


 


Urine Clarity  TURBID  (CLEAR)  H  06/28/18  05:05    


 


Urine pH  5.5  (4.6 - 8.0)   06/28/18  05:05    


 


Ur Specific Gravity  1.020  (1.005-1.030)   06/28/18  05:05    


 


Urine Protein  TRACE mg/dL (NEGATIVE)   06/28/18  05:05    


 


Urine Glucose (UA)  NEGATIVE mg/dL (NEGATIVE)   06/28/18  05:05    


 


Urine Ketones  15 mg/dL (NEGATIVE)  H  06/28/18  05:05    


 


Urine Blood  NEGATIVE  (NEGATIVE)   06/28/18  05:05    


 


Urine Nitrate  POSITIVE  (NEGATIVE)  H  06/28/18  05:05    


 


Urine Bilirubin  NEGATIVE  (NEGATIVE)   06/28/18  05:05    


 


Urine Urobilinogen  1.0 E.U./dL (0.2 - 1.0)   06/28/18  05:05    


 


Ur Leukocyte Esterase  MODERATE  (NEGATIVE)  H  06/28/18  05:05    


 


Urine RBC  0-2 /hpf (0-5)   06/28/18  05:05    


 


Urine WBC   /hpf (0-5)  H  06/28/18  05:05    


 


Ur Epithelial Cells  MODERATE /lpf (FEW)   06/28/18  05:05    


 


Urine Bacteria  4+ /hpf (NONE SEEN)  H  06/28/18  05:05    


 


Acetaminophen  < 10.0 ug/mL (10.0-30.0)  L  07/02/18  04:30    














- Physical Exam


Vitals and I&O: 


 Vital Signs











Temp  98.4 F   07/04/18 06:00


 


Pulse  95   07/04/18 08:59


 


Resp  22   07/04/18 08:18


 


BP  107/33   07/04/18 09:00


 


Pulse Ox  96   07/04/18 07:50








 Intake & Output











 07/03/18 07/04/18 07/04/18





 18:59 06:59 18:59


 


Intake Total 801.5 363.3333 


 


Balance 801.5 363.3333 


 


Weight (lbs) 80.286 kg 73.482 kg 


 


Intake:   


 


  Intake, IV Amount 201.5 363.3333 


 


    Amikacin 375 mg In 101.5  





    Dextrose 5% 100 ml @ 100   





    mls/hr IV Q48HR [facility] Rx#:   





    [id number]   


 


    metroNIDAZOLE 500mg/ 100 





    100mL 500 mg In 100 ml @   





    100 mls/hr IV Q12HR [facility]   





    Rx#:[id number]   


 


  TPN/  


 


Other:   


 


  # Voids 2 4 


 


  # Bowel Movements 0 1 


 


  Stool Characteristics  Soft 





  Formed 





  Brown 


 


  Weight Source Bedscale Bedscale 











Active Medications: 


Current Medications





Acetaminophen (Tylenol)  650 mg PO Q6H PRN


   PRN Reason: Pain (Mild)


   Stop: 08/27/18 02:01


Acetaminophen/Hydrocodone Bitart (Norco 5mg/325mg)  1 tab PO Q6H [facility]


   Stop: 08/26/18 21:29


   Last Admin: 07/04/18 03:22 Dose:  Not Given


Albuterol Sulfate (Albuterol 2.5mg/3ml Neb Ud)  1.25 mg HHN Q2HRT PRN


   PRN Reason: Shortness of Breath


   Stop: 08/27/18 02:12


   Last Admin: 07/03/18 07:39 Dose:  1.25 mg


Ascorbic Acid (Vitamin C)  500 mg PO DAILY [facility]


   Stop: 08/27/18 08:59


   Last Admin: 07/04/18 08:59 Dose:  Not Given


Atorvastatin Calcium (Lipitor)  10 mg PO DAILY [facility]; Protocol


   Stop: 08/30/18 08:59


   Last Admin: 07/04/18 08:59 Dose:  Not Given


Carvedilol (Coreg)  6.25 mg PO BID [facility]


   Stop: 08/28/18 16:59


   Last Admin: 07/04/18 08:59 Dose:  Not Given


Clopidogrel Bisulfate (Plavix)  75 mg PO DAILY [facility]


   Stop: 08/27/18 08:59


   Last Admin: 07/04/18 09:00 Dose:  Not Given


Diltiazem HCl (Cardizem)  60 mg PO Q6HR [facility]


   Stop: 08/28/18 17:59


   Last Admin: 07/04/18 06:13 Dose:  Not Given


Diltiazem HCl (Cardizem)  5 mg IVP Q6H PRN


   PRN Reason: HR more than 125bpm


   Stop: 08/30/18 03:29


Docusate Sodium (Colace)  100 mg PO BID [facility]


   Stop: 08/27/18 08:59


   Last Admin: 07/04/18 09:00 Dose:  Not Given


Donepezil HCl (Aricept)  10 mg PO HS [facility]


   Stop: 08/26/18 21:59


   Last Admin: 07/03/18 21:15 Dose:  Not Given


Enoxaparin Sodium 80 mg/ (Enoxaparin Sodium 40 mg)  120 mg SUBQ Q24H [facility]


   Stop: 09/01/18 15:59


   Last Admin: 07/03/18 15:51 Dose:  120 mg


Furosemide (Lasix)  40 mg IVP BID [facility]


   Stop: 09/01/18 16:59


   Last Admin: 07/04/18 09:00 Dose:  Not Given


Gabapentin (Neurontin)  600 mg PO Q6HR [facility]


   Stop: 08/27/18 05:59


   Last Admin: 07/04/18 06:14 Dose:  Not Given


Hydromorphone HCl (Dilaudid)  1 mg IVP Q6H PRN


   PRN Reason: Pain (Moderate)


   Stop: 08/28/18 16:38


   Last Admin: 07/02/18 10:59 Dose:  1 mg


Hydromorphone HCl (Dilaudid)  2 mg IVP Q6H PRN


   PRN Reason: SEVERE PAIN


   Stop: 08/28/18 21:10


   Last Admin: 07/04/18 03:36 Dose:  2 mg


Levofloxacin (Levaquin Pb)  250 mg in 50 mls @ 50 mls/hr IV Q24HR [facility]


   Stop: 08/27/18 00:59


   Last Admin: 07/04/18 00:11 Dose:  50 mls/hr


Metronidazole (Flagyl)  500 mg in 100 mls @ 100 mls/hr IV Q12HR [facility]


   Stop: 08/29/18 20:59


   Last Admin: 07/04/18 08:49 Dose:  100 mls/hr


Amikacin Sulfate 375 mg/ (Dextrose)  101.5 mls @ 100 mls/hr IV Q48HR [facility]


   Stop: 08/30/18 08:59


   Last Infusion: 07/03/18 11:05 Dose:  Infused


Multivitamins/Minerals 10 ml/Dextrose/ Amino Acids/Electrolytes  1,200 mls @ 50 

mls/hr IV .Q24H [facility]


   Stop: 08/01/18 16:59


   Last Admin: 07/03/18 16:11 Dose:  50 mls/hr


Insulin Aspart (Novolog Insulin Sliding Scale)  0 units SUBQ Q6HR [facility]; Protocol


   Stop: 08/30/18 17:59


   Last Admin: 07/04/18 06:14 Dose:  Not Given


Levothyroxine Sodium (Synthroid)  0.025 mg PO DAILY@0730 [facility]


   Stop: 08/27/18 07:29


   Last Admin: 07/04/18 06:32 Dose:  Not Given


Lorazepam (Ativan)  0.5 mg IVP Q6HR [facility]; Protocol


   PRN Reason: Agitation


   Stop: 08/29/18 11:03


   Last Admin: 07/03/18 19:58 Dose:  0.5 mg


Miscellaneous (Amikacin Iv Per Pharmacy)  1 [facility] PRN PRN


   PRN Reason: Protocol


   Stop: 08/29/18 15:52


Miscellaneous (Lovenox Subq Per Pharmacy)  1 [facility] PRN PRN


   PRN Reason: PROTOCOL


   Stop: 08/29/18 11:40


Miscellaneous (Ppn Per Pharmacy)  1 [facility] PRN PRN


   PRN Reason: PROTOCOL


   Stop: 08/30/18 10:04


Montelukast Sodium (Singulair)  10 mg PO DAILY [facility]


   Stop: 08/27/18 08:59


   Last Admin: 07/04/18 09:00 Dose:  Not Given


Morphine Sulfate (Morphine)  1 mg IVP Q4HR PRN


   PRN Reason: Moderate Pain


   Stop: 08/26/18 21:35


   Last Admin: 07/02/18 03:48 Dose:  1 mg


Ondansetron HCl (Zofran)  4 mg IV Q6H PRN


   PRN Reason: Nausea / Vomiting


   Stop: 08/26/18 21:34


   Last Admin: 06/29/18 16:26 Dose:  4 mg


Pantoprazole Sodium (Protonix)  40 mg IVP DAILY [facility]


   Stop: 08/30/18 08:59


   Last Admin: 07/04/18 08:50 Dose:  40 mg


Prednisone (Deltasone)  5 mg PO DAILY [facility]


   Stop: 08/27/18 08:59


   Last Admin: 07/04/18 09:01 Dose:  Not Given








General: Mild distress (confused)


Neck: Supple


Cardiovascular: Regular rate


Lungs: Clear to auscultation


Abdomen: Bowel sounds, Soft, Tender (LLQ), Distended (MILD)





Assessment/Plan





- Assessment


Assessment: 





86 YO FEMALE WITH SIGMOID DIVERTICULITIS.  MAY ALSO HAVE ISCHEMIC COLITIS.


ALSO WITH CHOLELITHIASIS AND ELEVATED TRANSAMINASES 


HIDA SUGGEST CHOLECYSTITIS


 


1. ABX.


2. NPO AND TPN.


3. PER CARDS TO OPTIMIZE CARDIAC FUNCTION.


4. FAILED SWALLOW EVAL.  POLST OUTLINED WISH FOR DNR AND NO ARTIFICIAL FEEDING.


5. SURGERY EVAL PER HOSPITALIST FOR CHOLECYSTITIS

## 2018-07-05 LAB
ACTIN IGG SERPL-ACNC: 17 UNITS (ref 0–19)
ALBUMIN SERPL-MCNC: 2.1 GM/DL (ref 3.7–5.3)
ALBUMIN/GLOB SERPL: 0.8 {RATIO} (ref 1–1.8)
ALP SERPL-CCNC: 40 U/L (ref 34–104)
ALT SERPL-CCNC: 173 U/L (ref 7–52)
ANION GAP SERPL CALC-SCNC: 12.9 MMOL/L (ref 7–16)
AST SERPL-CCNC: 27 U/L (ref 13–39)
BASOPHILS # BLD AUTO: 0 TH/CUMM (ref 0–0.2)
BASOPHILS NFR BLD AUTO: 0.1 % (ref 0–2)
BILIRUB SERPL-MCNC: 0.7 MG/DL (ref 0.3–1)
BUN SERPL-MCNC: 55 MG/DL (ref 7–25)
CALCIUM SERPL-MCNC: 7.5 MG/DL (ref 8.6–10.3)
CHLORIDE SERPL-SCNC: 116 MEQ/L (ref 98–107)
CO2 SERPL-SCNC: 15.1 MEQ/L (ref 21–31)
CREAT SERPL-MCNC: 2.2 MG/DL (ref 0.6–1.2)
EOSINOPHIL # BLD AUTO: 0.1 TH/CMM (ref 0.1–0.4)
EOSINOPHIL NFR BLD AUTO: 0.8 % (ref 0–5)
ERYTHROCYTE [DISTWIDTH] IN BLOOD BY AUTOMATED COUNT: 16.2 % (ref 11.5–20)
FERRITIN SERPL-MCNC: 1079 NG/ML (ref 15–150)
GLOBULIN SER-MCNC: 2.7 GM/DL
GLUCOSE SERPL-MCNC: 137 MG/DL (ref 70–105)
HCT VFR BLD CALC: 38.7 % (ref 41–60)
HCV AB S/CO SERPL IA: <0.1 S/CO RATIO (ref 0–0.9)
HGB BLD-MCNC: 12.8 GM/DL (ref 12–16)
IRON SERPL-MCNC: 49 UG/DL (ref 27–139)
LYMPHOCYTE AB SER FC-ACNC: 2.2 TH/CMM (ref 1.5–3)
LYMPHOCYTES NFR BLD AUTO: 15.4 % (ref 20–50)
MAGNESIUM SERPL-MCNC: 2.1 MG/DL (ref 1.9–2.7)
MCH RBC QN AUTO: 30.7 PG (ref 27–31)
MCHC RBC AUTO-ENTMCNC: 33 PG (ref 28–36)
MCV RBC AUTO: 93.1 FL (ref 81–100)
MONOCYTES # BLD AUTO: 1.5 TH/CMM (ref 0.3–1)
MONOCYTES NFR BLD AUTO: 10.1 % (ref 2–10)
NEUTROPHILS # BLD: 10.8 TH/CMM (ref 1.8–8)
NEUTROPHILS NFR BLD AUTO: 73.6 % (ref 40–80)
PHOSPHATE SERPL-MCNC: 3.4 MG/DL (ref 2.5–5)
PLATELET # BLD: 140 TH/CMM (ref 150–400)
PMV BLD AUTO: 8 FL
POTASSIUM SERPL-SCNC: 4 MEQ/L (ref 3.5–5.1)
RBC # BLD AUTO: 4.16 MIL/CMM (ref 3.8–5.2)
SODIUM SERPL-SCNC: 140 MEQ/L (ref 136–145)
TIBC SERPL-MCNC: 151 UG/DL (ref 250–450)
UIBC SERPL-MCNC: 102 UG/DL (ref 118–369)
WBC # BLD AUTO: 14.6 TH/CMM (ref 4.8–10.8)

## 2018-07-05 RX ADMIN — INSULIN ASPART SCH: 100 INJECTION, SOLUTION INTRAVENOUS; SUBCUTANEOUS at 06:40

## 2018-07-05 RX ADMIN — HYDROCODONE BITARTRATE AND ACETAMINOPHEN SCH: 5; 325 TABLET ORAL at 15:38

## 2018-07-05 RX ADMIN — DILTIAZEM HYDROCHLORIDE SCH MG: 30 TABLET, FILM COATED ORAL at 01:25

## 2018-07-05 RX ADMIN — HYDROCODONE BITARTRATE AND ACETAMINOPHEN SCH: 5; 325 TABLET ORAL at 21:56

## 2018-07-05 RX ADMIN — HYDROMORPHONE HYDROCHLORIDE PRN MG: 1 INJECTION, SOLUTION INTRAMUSCULAR; INTRAVENOUS; SUBCUTANEOUS at 03:50

## 2018-07-05 RX ADMIN — INSULIN ASPART SCH: 100 INJECTION, SOLUTION INTRAVENOUS; SUBCUTANEOUS at 01:20

## 2018-07-05 RX ADMIN — HYDROCODONE BITARTRATE AND ACETAMINOPHEN SCH: 5; 325 TABLET ORAL at 01:23

## 2018-07-05 RX ADMIN — LEVOFLOXACIN SCH MLS/HR: 5 INJECTION INTRAVENOUS at 01:18

## 2018-07-05 RX ADMIN — DILTIAZEM HYDROCHLORIDE SCH: 30 TABLET, FILM COATED ORAL at 11:19

## 2018-07-05 RX ADMIN — DILTIAZEM HYDROCHLORIDE SCH MG: 30 TABLET, FILM COATED ORAL at 06:34

## 2018-07-05 RX ADMIN — HYDROMORPHONE HYDROCHLORIDE PRN MG: 2 INJECTION INTRAMUSCULAR; INTRAVENOUS; SUBCUTANEOUS at 11:16

## 2018-07-05 RX ADMIN — INSULIN ASPART SCH UNITS: 100 INJECTION, SOLUTION INTRAVENOUS; SUBCUTANEOUS at 17:33

## 2018-07-05 RX ADMIN — HYDROCODONE BITARTRATE AND ACETAMINOPHEN SCH: 5; 325 TABLET ORAL at 08:36

## 2018-07-05 RX ADMIN — METRONIDAZOLE SCH MLS/HR: 500 INJECTION, SOLUTION INTRAVENOUS at 21:56

## 2018-07-05 RX ADMIN — DILTIAZEM HYDROCHLORIDE SCH: 30 TABLET, FILM COATED ORAL at 17:28

## 2018-07-05 RX ADMIN — LEVOTHYROXINE SODIUM SCH: 100 TABLET ORAL at 06:34

## 2018-07-05 RX ADMIN — DILTIAZEM HYDROCHLORIDE SCH: 30 TABLET, FILM COATED ORAL at 06:36

## 2018-07-05 RX ADMIN — METRONIDAZOLE SCH MLS/HR: 500 INJECTION, SOLUTION INTRAVENOUS at 08:05

## 2018-07-05 RX ADMIN — HYDROCODONE BITARTRATE AND ACETAMINOPHEN SCH TAB: 5; 325 TABLET ORAL at 21:54

## 2018-07-05 RX ADMIN — INSULIN ASPART SCH: 100 INJECTION, SOLUTION INTRAVENOUS; SUBCUTANEOUS at 11:18

## 2018-07-05 RX ADMIN — ASCORBIC ACID, VITAMIN A PALMITATE, CHOLECALCIFEROL, THIAMINE HYDROCHLORIDE, RIBOFLAVIN-5 PHOSPHATE SODIUM, PYRIDOXINE HYDROCHLORIDE, NIACINAMIDE, DEXPANTHENOL, ALPHA-TOCOPHEROL ACETATE, VITAMIN K1, FOLIC ACID, BIOTIN, CYANOCOBALAMIN SCH MLS/HR: 200; 3300; 200; 6; 3.6; 6; 40; 15; 10; 150; 600; 60; 5 INJECTION, SOLUTION INTRAVENOUS at 16:03

## 2018-07-05 NOTE — GENERAL PROGRESS NOTE
Subjective





- Review of Systems


Service Date: 18


Events since last encounter: 





discussed with son who does not want anything done





Objective





- Results


Result Diagrams: 


 18 06:40





 18 05:00


Recent Labs: 


 Laboratory Last Values











WBC  14.6 Th/cmm (4.8-10.8)  H  18  06:40    


 


RBC  4.16 Mil/cmm (3.80-5.20)   18  06:40    


 


Hgb  12.8 gm/dL (12-16)   18  06:40    


 


Hct  38.7 % (41.0-60)  L  18  06:40    


 


MCV  93.1 fl ()   18  06:40    


 


MCH  30.7 pg (27.0-31.0)   18  06:40    


 


MCHC Differential  33.0 pg (28.0-36.0)   18  06:40    


 


RDW  16.2 % (11.5-20.0)   18  06:40    


 


Plt Count  140 Th/cmm (150-400)  L  18  06:40    


 


MPV  8.0 fl  18  06:40    


 


Neutrophils %  73.6 % (40.0-80.0)   18  06:40    


 


Lymphocytes %  15.4 % (20.0-50.0)  L  18  06:40    


 


Monocytes %  10.1 % (2.0-10.0)  H  18  06:40    


 


Eosinophils %  0.8 % (0.0-5.0)   18  06:40    


 


Basophils %  0.1 % (0.0-2.0)   18  06:40    


 


PT  15.5 SECONDS (9.5-11.5)  H  18  11:49    


 


INR  1.46  (0.5-1.4)  H  18  11:49    


 


PTT (Actin FS)  34.3 SECONDS (26.0-38.0)   18  11:49    


 


Specimen Source  arterial   18  18:58    


 


Sample Site  rr   18  18:58    


 


pH  7.38  (7.35-7.45)   18  18:58    


 


pCO2  41.0 mmHg (35.0-45.0)   18  18:58    


 


pO2  44.0 mmHg (80.0-100.0)  L*  18  18:58    


 


HCO3  23.9 mEq/L (20.0-26.0)   18  18:58    


 


Base Excess  -0.8 mEq/L (-3.0-3.0)   18  18:58    


 


O2 Saturation  79.0 % (92.0-100.0)  L  18  18:58    


 


Magdaleno Test  y   18  18:58    


 


Vent Rate  N/A   18  18:58    


 


Inspired O2  36   18  18:58    


 


Tidal Volume  N/A   18  18:58    


 


PEEP  N/A   18  18:58    


 


Pressure (ins/psv/peep)  N/A   18  18:58    


 


Critical Value  MM/JY   18  18:58    


 


Sodium  140 mEq/L (136-145)   18  05:00    


 


Potassium  4.0 mEq/L (3.5-5.1)   18  05:00    


 


Chloride  116 mEq/L ()  H  18  05:00    


 


Carbon Dioxide  15.1 mEq/L (21.0-31.0)  L  18  05:00    


 


Anion Gap  12.9  (7.0-16.0)   18  05:00    


 


BUN  55 mg/dL (7-25)  H  18  05:00    


 


Creatinine  2.2 mg/dL (0.6-1.2)  H  18  05:00    


 


Est GFR ( Amer)  TNP   18  05:00    


 


Est GFR (Non-Af Amer)  TNP   18  05:00    


 


BUN/Creatinine Ratio  25.0   18  05:00    


 


Glucose  137 mg/dL ()  H  18  05:00    


 


POC Glucose  130 MG/DL (70 - 105)  H  18  11:18    


 


Hemoglobin A1c %  5.1 % (4.0-6.0)   18  18:40    


 


Calcium  7.5 mg/dL (8.6-10.3)  L  18  05:00    


 


Phosphorus  3.4 mg/dL (2.5-5.0)   18  05:00    


 


Magnesium  2.1 mg/dL (1.9-2.7)   18  05:00    


 


Total Bilirubin  0.7 mg/dL (0.3-1.0)   18  05:00    


 


Direct Bilirubin  0.24 mg/dL (0.0-0.2)  H  18  04:30    


 


AST  27 U/L (13-39)   18  05:00    


 


ALT  173 U/L (7-52)  H  18  05:00    


 


Alkaline Phosphatase  40 U/L ()   18  05:00    


 


Creatine Kinase  21 U/L ()  L  18  18:40    


 


Troponin I  0.80 ng/mL (0.01-0.05)  H* D 18  05:50    


 


B-Natriuretic Peptide  > 5000.0 pg/mL (5.0-100.0)  H  18  05:10    


 


Total Protein  4.8 gm/dL (6.0-8.3)  L  18  05:00    


 


Albumin  2.1 gm/dL (3.7-5.3)  L  18  05:00    


 


Globulin  2.7 gm/dL  18  05:00    


 


Albumin/Globulin Ratio  0.8  (1.0-1.8)  L  18  05:00    


 


Triglycerides  149 mg/dL (<150)   18  12:37    


 


Cholesterol  84 mg/dL (<200)   18  12:37    


 


LDL Cholesterol Direct  47 mg/dL ()  L  18  12:37    


 


HDL Cholesterol  18 mg/dL (23-92)  L  18  12:37    


 


Amylase  12 U/L ()  L  18  18:40    


 


Lipase  6 U/L (11-82)  L  18  18:40    


 


TSH  1.72 uIU/ml (0.34-5.60)   18  09:59    


 


Urine Source  CATH   18  05:05    


 


Urine Color  YELLOW   18  05:05    


 


Urine Clarity  TURBID  (CLEAR)  H  18  05:05    


 


Urine pH  5.5  (4.6 - 8.0)   18  05:05    


 


Ur Specific Gravity  1.020  (1.005-1.030)   18  05:05    


 


Urine Protein  TRACE mg/dL (NEGATIVE)   18  05:05    


 


Urine Glucose (UA)  NEGATIVE mg/dL (NEGATIVE)   18  05:05    


 


Urine Ketones  15 mg/dL (NEGATIVE)  H  18  05:05    


 


Urine Blood  NEGATIVE  (NEGATIVE)   18  05:05    


 


Urine Nitrate  POSITIVE  (NEGATIVE)  H  18  05:05    


 


Urine Bilirubin  NEGATIVE  (NEGATIVE)   18  05:05    


 


Urine Urobilinogen  1.0 E.U./dL (0.2 - 1.0)   18  05:05    


 


Ur Leukocyte Esterase  MODERATE  (NEGATIVE)  H  18  05:05    


 


Urine RBC  0-2 /hpf (0-5)   18  05:05    


 


Urine WBC   /hpf (0-5)  H  18  05:05    


 


Ur Epithelial Cells  MODERATE /lpf (FEW)   18  05:05    


 


Urine Bacteria  4+ /hpf (NONE SEEN)  H  18  05:05    


 


Amikacin Peak  20.9 ug/mL (20.0-30.0)   18  12:21    


 


Amikacin Trough  4.3 ug/mL (1.0-8.0)   18  05:50    


 


Acetaminophen  < 10.0 ug/mL (10.0-30.0)  L  18  04:30    














- Physical Exam


Vitals and I&O: 


 Vital Signs











Temp  97.2 F   18 12:00


 


Pulse  110   18 12:00


 


Resp  28   18 12:00


 


BP  112/26   18 12:00


 


Pulse Ox  93   18 12:00








 Intake & Output











 18





 18:59 06:59 18:59


 


Intake Total 1300 150 201.5


 


Balance 1300 150 201.5


 


Weight (lbs) 79.464 kg 79.379 kg 


 


Intake:   


 


  Intake, IV Amount 1300 150 201.5


 


    Amikacin 375 mg In   101.5





    Dextrose 5% 100 ml @ 100   





    mls/hr IV Q48HR [facility] Rx#:   





    [id number]   


 


    Levofloxacin 250mg/50mL  50 





    250 mg In 50 ml @ 50 mls/   





    hr IV Q24HR [facility] Rx#:   





    [id number]   


 


    Multivitamin Inj 10 ml In 1200  





    Dextrose 70% 690 ml In   





    Amino Acids 8.5% 500 ml @   





    50 mls/hr IV .Q24H [facility]   





    Rx#:[id number]   


 


    metroNIDAZOLE 500mg/ 100 100





    100mL 500 mg In 100 ml @   





    100 mls/hr IV Q12HR [facility]   





    Rx#:[id number]   


 


Other:   


 


  # Voids 3 3 


 


  # Bowel Movements 1  


 


  Stool Characteristics Soft Liquid 





 Formed Brown 





 Brown  


 


  Weight Source Bedscale Bedscale 











Active Medications: 


Current Medications





Acetaminophen (Tylenol)  650 mg PO Q6H PRN


   PRN Reason: Pain (Mild)


   Stop: 18 02:01


Acetaminophen/Hydrocodone Bitart (Norco 5mg/325mg)  1 tab PO Q6H [facility]


   Stop: 18 21:29


   Last Admin: 18 08:36 Dose:  Not Given


Albuterol Sulfate (Albuterol 2.5mg/3ml Neb Ud)  1.25 mg HHN Q2HRT PRN


   PRN Reason: Shortness of Breath


   Stop: 18 02:12


   Last Admin: 18 07:39 Dose:  1.25 mg


Ascorbic Acid (Vitamin C)  500 mg PO DAILY [facility]


   Stop: 18 08:59


   Last Admin: 18 08:34 Dose:  Not Given


Atorvastatin Calcium (Lipitor)  10 mg PO DAILY [facility]; Protocol


   Stop: 18 08:59


   Last Admin: 18 08:34 Dose:  Not Given


Carvedilol (Coreg)  6.25 mg PO BID [facility]


   Stop: 18 16:59


   Last Admin: 18 08:35 Dose:  Not Given


Clopidogrel Bisulfate (Plavix)  75 mg PO DAILY [facility]


   Stop: 18 08:59


   Last Admin: 18 08:35 Dose:  Not Given


Diltiazem HCl (Cardizem)  60 mg PO Q6HR [facility]


   Stop: 18 17:59


   Last Admin: 18 11:19 Dose:  Not Given


Diltiazem HCl (Cardizem)  5 mg IVP Q6H PRN


   PRN Reason: HR more than 125bpm


   Stop: 18 03:29


Docusate Sodium (Colace)  100 mg PO BID [facility]


   Stop: 18 08:59


   Last Admin: 18 08:35 Dose:  Not Given


Donepezil HCl (Aricept)  10 mg PO HS [facility]


   Stop: 18 21:59


   Last Admin: 18 01:23 Dose:  10 mg


Enoxaparin Sodium 80 mg/ (Enoxaparin Sodium 40 mg)  120 mg SUBQ Q24H [facility]


   Stop: 18 15:59


   Last Admin: 18 16:40 Dose:  120 mg


Furosemide (Lasix)  40 mg IVP BID [facility]


   Stop: 18 16:59


   Last Admin: 18 08:35 Dose:  Not Given


Gabapentin (Neurontin)  600 mg PO Q6HR [facility]


   Stop: 18 05:59


   Last Admin: 18 11:20 Dose:  Not Given


Hydromorphone HCl (Dilaudid)  1 mg IVP Q6H PRN


   PRN Reason: Pain (Moderate)


   Stop: 18 16:38


   Last Admin: 18 03:50 Dose:  1 mg


Hydromorphone HCl (Dilaudid)  2 mg IVP Q6H PRN


   PRN Reason: SEVERE PAIN


   Stop: 18 21:10


   Last Admin: 18 11:16 Dose:  2 mg


Levofloxacin (Levaquin Pb)  250 mg in 50 mls @ 50 mls/hr IV Q24HR [facility]


   Stop: 18 00:59


   Last Infusion: 18 02:20 Dose:  Infused


Metronidazole (Flagyl)  500 mg in 100 mls @ 100 mls/hr IV Q12HR [facility]


   Stop: 18 20:59


   Last Infusion: 18 09:05 Dose:  Infused


Amikacin Sulfate 375 mg/ (Dextrose)  101.5 mls @ 100 mls/hr IV Q48HR [facility]


   Stop: 18 08:59


   Last Infusion: 18 09:30 Dose:  Infused


Multivitamins/Minerals 10 ml/Dextrose/ Amino Acids/Electrolytes  1,200 mls @ 50 

mls/hr IV .Q24H [facility]


   Stop: 18 16:59


   Last Admin: 18 16:16 Dose:  50 mls/hr


Insulin Aspart (Novolog Insulin Sliding Scale)  0 units SUBQ Q6HR [facility]; Protocol


   Stop: 18 17:59


   Last Admin: 18 11:18 Dose:  Not Given


Levothyroxine Sodium (Synthroid)  0.025 mg PO DAILY@0730 [facility]


   Stop: 18 07:29


   Last Admin: 18 06:34 Dose:  Not Given


Lorazepam (Ativan)  0.5 mg IVP Q6HR PRN; Protocol


   PRN Reason: Agitation


   Stop: 18 11:03


   Last Admin: 18 08:20 Dose:  0.5 mg


Miscellaneous (Amikacin Iv Per Pharmacy)  1 [facility] PRN PRN


   PRN Reason: Protocol


   Stop: 18 15:52


Miscellaneous (Lovenox Subq Per Pharmacy)  1 [facility] PRN PRN


   PRN Reason: PROTOCOL


   Stop: 18 11:40


Miscellaneous (Ppn Per Pharmacy)  1 [facility] PRN PRN


   PRN Reason: PROTOCOL


   Stop: 18 10:04


Montelukast Sodium (Singulair)  10 mg PO DAILY [facility]


   Stop: 18 08:59


   Last Admin: 18 08:36 Dose:  Not Given


Morphine Sulfate (Morphine)  1 mg IVP Q4HR PRN


   PRN Reason: Moderate Pain


   Stop: 18 21:35


   Last Admin: 18 03:48 Dose:  1 mg


Ondansetron HCl (Zofran)  4 mg IV Q6H PRN


   PRN Reason: Nausea / Vomiting


   Stop: 18 21:34


   Last Admin: 18 16:26 Dose:  4 mg


Pantoprazole Sodium (Protonix)  40 mg IVP DAILY [facility]


   Stop: 18 08:59


   Last Admin: 18 08:28 Dose:  40 mg


Prednisone (Deltasone)  5 mg PO DAILY [facility]


   Stop: 18 08:59


   Last Admin: 18 08:36 Dose:  Not Given








General: Mild distress (confused)


Neck: Supple


Cardiovascular: Regular rate


Lungs: Clear to auscultation


Abdomen: Bowel sounds, Soft, Tender (LLQ), Distended (MILD)





Nutritional Asmnt/Malnutr-PDOC





- Dietary Evaluation


Malnutrition Findings (Please click <Entered> for more info): 








Nutritional Asmnt/Malnutrition                             Start:  18 14:

47


Text:                                                      Status: Complete    

  


Freq:                                                                          

  


Protocol:                                                                      

  


 Document     18 14:47  LCMARIANAG  (Rec: 18 15:32  MARIANAG  [id number])


 Nutritional Asmnt/Malnutrition


     Patient General Information


      Nutritional Screening                      High Risk


      Diagnosis                                  elevated troponin, hypoxia,


                                                 possible PNA


      Pertinent Medical Hx/Surgical Hx           HTN, CAD, asthma/COPD, PUD/


                                                 GERD, ESRD, dementia


      Subjective Information                     Pt seen on mask at time of


                                                 visit. Pt was NPO for CT


                                                 scheduled today, no PO intake


                                                 information available.


      Current Diet Order/ Nutrition Support      NPO


      Pertinent Medications                      vit C, colace, levaquin,


                                                 synthorid, zofran, nacl 0.9%,


                                                 piperacillin, protonix


      Pertinent Labs                              Na 135, Glucose 190, A1c


                                                 5.1


     Nutritional Hx/Data


      Height                                     1.63 m


      Height (Calculated Centimeters)            162.6


      Current Weight (lbs)                       73.936 kg


      Weight (Calculated Kilograms)              73.9


      Weight (Calculated Grams)                  05618.6


      Ideal Body Weight                          120


      Body Mass Index (BMI)                      27.9


      Weight Status                              Overweight


     GI Symptoms


      GI Symptoms                                None


      Last BM                                    none


      Difficult in:                              None


      Skin Integrity/Comment:                    reddened to coccyx, buttocks


     Estimated Nutritional Goals


      BEE in Kcals:                              Using Current wt


      Calories/Kcals/Kg                          23-27


      Kcals Calculated                           [id number]


      Protein g/k


      Protein Calculated                         74


      Fluid: ml                                  1702-1998ml (1ml/kcal)


     Nutritional Problem


      1. Problem


       Problem                                   altered nutrition related labs


       Etiology                                  hyperglycemia


       Signs/Symptoms:                           glucose 190 at admission


     Malnutrition Alert


      Is there a minimum of two criteria         No


       selected?                                 


       Query Text:Check all the applicable       


       criteria. A minimum of two criteria are   


       recommended for diagnosis of either       


       severe or non-severe malnutrition.        


     Malnutrition Related to Morbid Obesity


      Malnutrition related to morbid obesity     No


     Intervention/Recommendation


      Comments                                   1. Resume pureed JOJO diet


                                                 after CT exam. Assist pt with


                                                 meals. If glucose continue


                                                 high, will consider adding


                                                 CCHO diet.


                                                 2. Monitor PO intake, wt, labs


                                                 and skin integrity


                                                 3. F/U as high risk in 2-3


                                                 days, -


     Expected Outcomes/Goals


      Expected Outcomes/Goals                    1. PO intake to meet at least


                                                 75% of nutritional needs.


                                                 2. Wt stability, skin to


                                                 remain intact, labs to


                                                 approach WNL.

## 2018-07-05 NOTE — GENERAL PROGRESS NOTE
Subjective





- Review of Systems


Subjective: 


awake, confused 


hidaz positive





Objective





- Results


Result Diagrams: 


 18 06:40





 18 05:00


Recent Labs: 


 Laboratory Last Values











WBC  14.6 Th/cmm (4.8-10.8)  H  18  06:40    


 


RBC  4.16 Mil/cmm (3.80-5.20)   18  06:40    


 


Hgb  12.8 gm/dL (12-16)   18  06:40    


 


Hct  38.7 % (41.0-60)  L  18  06:40    


 


MCV  93.1 fl ()   18  06:40    


 


MCH  30.7 pg (27.0-31.0)   18  06:40    


 


MCHC Differential  33.0 pg (28.0-36.0)   18  06:40    


 


RDW  16.2 % (11.5-20.0)   18  06:40    


 


Plt Count  140 Th/cmm (150-400)  L  18  06:40    


 


MPV  8.0 fl  18  06:40    


 


Neutrophils %  73.6 % (40.0-80.0)   18  06:40    


 


Lymphocytes %  15.4 % (20.0-50.0)  L  18  06:40    


 


Monocytes %  10.1 % (2.0-10.0)  H  18  06:40    


 


Eosinophils %  0.8 % (0.0-5.0)   18  06:40    


 


Basophils %  0.1 % (0.0-2.0)   18  06:40    


 


PT  15.5 SECONDS (9.5-11.5)  H  18  11:49    


 


INR  1.46  (0.5-1.4)  H  18  11:49    


 


PTT (Actin FS)  34.3 SECONDS (26.0-38.0)   18  11:49    


 


Specimen Source  arterial   18  18:58    


 


Sample Site  rr   18  18:58    


 


pH  7.38  (7.35-7.45)   18  18:58    


 


pCO2  41.0 mmHg (35.0-45.0)   18  18:58    


 


pO2  44.0 mmHg (80.0-100.0)  L*  18  18:58    


 


HCO3  23.9 mEq/L (20.0-26.0)   18  18:58    


 


Base Excess  -0.8 mEq/L (-3.0-3.0)   18  18:58    


 


O2 Saturation  79.0 % (92.0-100.0)  L  18  18:58    


 


Magdaleno Test  y   18  18:58    


 


Vent Rate  N/A   18  18:58    


 


Inspired O2  36   18  18:58    


 


Tidal Volume  N/A   18  18:58    


 


PEEP  N/A   18  18:58    


 


Pressure (ins/psv/peep)  N/A   18  18:58    


 


Critical Value  MM/JY   18  18:58    


 


Sodium  140 mEq/L (136-145)   18  05:00    


 


Potassium  4.0 mEq/L (3.5-5.1)   18  05:00    


 


Chloride  116 mEq/L ()  H  18  05:00    


 


Carbon Dioxide  15.1 mEq/L (21.0-31.0)  L  18  05:00    


 


Anion Gap  12.9  (7.0-16.0)   18  05:00    


 


BUN  55 mg/dL (7-25)  H  18  05:00    


 


Creatinine  2.2 mg/dL (0.6-1.2)  H  18  05:00    


 


Est GFR ( Amer)  TNP   18  05:00    


 


Est GFR (Non-Af Amer)  [facility]   18  05:00    


 


BUN/Creatinine Ratio  25.0   18  05:00    


 


Glucose  137 mg/dL ()  H  18  05:00    


 


POC Glucose  130 MG/DL (70 - 105)  H  18  11:18    


 


Hemoglobin A1c %  5.1 % (4.0-6.0)   18  18:40    


 


Calcium  7.5 mg/dL (8.6-10.3)  L  18  05:00    


 


Phosphorus  3.4 mg/dL (2.5-5.0)   18  05:00    


 


Magnesium  2.1 mg/dL (1.9-2.7)   18  05:00    


 


Iron  49 ug/dL ()   18  04:30    


 


TIBC  151 ug/dL (250-450)  L  18  04:30    


 


Iron Saturation  32 % (15-55)   18  04:30    


 


Unsaturated IBC  102 ug/dL (118-369)  L  18  04:30    


 


Ferritin  1079 ng/mL ()  H  18  04:30    


 


Total Bilirubin  0.7 mg/dL (0.3-1.0)   18  05:00    


 


Direct Bilirubin  0.24 mg/dL (0.0-0.2)  H  18  04:30    


 


AST  27 U/L (13-39)   18  05:00    


 


ALT  173 U/L (7-52)  H  18  05:00    


 


Alkaline Phosphatase  40 U/L ()   18  05:00    


 


Creatine Kinase  21 U/L ()  L  18  18:40    


 


Troponin I  0.80 ng/mL (0.01-0.05)  H* D 18  05:50    


 


B-Natriuretic Peptide  > 5000.0 pg/mL (5.0-100.0)  H  18  05:10    


 


Total Protein  4.8 gm/dL (6.0-8.3)  L  18  05:00    


 


Albumin  2.1 gm/dL (3.7-5.3)  L  18  05:00    


 


Globulin  2.7 gm/dL  18  05:00    


 


Albumin/Globulin Ratio  0.8  (1.0-1.8)  L  18  05:00    


 


Triglycerides  149 mg/dL (<150)   18  12:37    


 


Cholesterol  84 mg/dL (<200)   18  12:37    


 


LDL Cholesterol Direct  47 mg/dL ()  L  18  12:37    


 


HDL Cholesterol  18 mg/dL (23-92)  L  18  12:37    


 


Amylase  12 U/L ()  L  18  18:40    


 


Lipase  6 U/L (11-82)  L  18  18:40    


 


TSH  1.72 uIU/ml (0.34-5.60)   18  09:59    


 


Urine Source  CATH   18  05:05    


 


Urine Color  YELLOW   18  05:05    


 


Urine Clarity  TURBID  (CLEAR)  H  18  05:05    


 


Urine pH  5.5  (4.6 - 8.0)   18  05:05    


 


Ur Specific Gravity  1.020  (1.005-1.030)   18  05:05    


 


Urine Protein  TRACE mg/dL (NEGATIVE)   18  05:05    


 


Urine Glucose (UA)  NEGATIVE mg/dL (NEGATIVE)   18  05:05    


 


Urine Ketones  15 mg/dL (NEGATIVE)  H  18  05:05    


 


Urine Blood  NEGATIVE  (NEGATIVE)   18  05:05    


 


Urine Nitrate  POSITIVE  (NEGATIVE)  H  18  05:05    


 


Urine Bilirubin  NEGATIVE  (NEGATIVE)   18  05:05    


 


Urine Urobilinogen  1.0 E.U./dL (0.2 - 1.0)   18  05:05    


 


Ur Leukocyte Esterase  MODERATE  (NEGATIVE)  H  18  05:05    


 


Urine RBC  0-2 /hpf (0-5)   18  05:05    


 


Urine WBC   /hpf (0-5)  H  18  05:05    


 


Ur Epithelial Cells  MODERATE /lpf (FEW)   18  05:05    


 


Urine Bacteria  4+ /hpf (NONE SEEN)  H  18  05:05    


 


Amikacin Peak  20.9 ug/mL (20.0-30.0)   18  12:21    


 


Amikacin Trough  4.3 ug/mL (1.0-8.0)   18  05:50    


 


Acetaminophen  < 10.0 ug/mL (10.0-30.0)  L  18  04:30    


 


Smooth Muscle IgG Ab  17 Units (0-19)   18  04:30    


 


Hepatitis A IgM Ab  Negative  (Negative)   18  04:30    


 


Hep Bs Antigen  Negative  (Negative)   18  04:30    


 


Hep B Core IgM Ab  Negative  (Negative)   18  04:30    


 


Hepatitis C Antibody  <0.1 s/co ratio (0.0-0.9)   18  04:30    














- Physical Exam


Vitals and I&O: 


 Vital Signs











Temp  99.8 F   18 15:00


 


Pulse  107   18 15:00


 


Resp  24   18 15:00


 


BP  112/22   18 15:00


 


Pulse Ox  93   18 15:00








 Intake & Output











 18





 18:59 06:59 18:59


 


Intake Total 0905 705 4398.667


 


Balance 0969 921 9237.667


 


Weight (lbs) 79.464 kg 79.379 kg 


 


Intake:   


 


  Intake, IV Amount 8270 147 3128.667


 


    Amikacin 375 mg In   101.5





    Dextrose 5% 100 ml @ 100   





    mls/hr IV Q48HR [facility] Rx#:   





    [id number]   


 


    Levofloxacin 250mg/50mL  50 





    250 mg In 50 ml @ 50 mls/   





    hr IV Q24HR [facility] Rx#:   





    [id number]   


 


    Multivitamin Inj 10 ml In 1200  1189.167





    Dextrose 70% 690 ml In   





    Amino Acids 8.5% 500 ml @   





    50 mls/hr IV .Q24H [facility]   





    Rx#:[id number]   


 


    metroNIDAZOLE 500mg/ 100 100





    100mL 500 mg In 100 ml @   





    100 mls/hr IV Q12HR [facility]   





    Rx#:[id number]   


 


Other:   


 


  # Voids 3 3 


 


  # Bowel Movements 1  


 


  Stool Characteristics Soft Liquid 





 Formed Brown 





 Brown  


 


  Weight Source Bedscale Bedscale 











Active Medications: 


Current Medications





Acetaminophen (Tylenol)  650 mg PO Q6H PRN


   PRN Reason: Pain (Mild)


   Stop: 18 02:01


Acetaminophen/Hydrocodone Bitart (Norco 5mg/325mg)  1 tab PO Q6H [facility]


   Stop: 18 21:29


   Last Admin: 18 15:38 Dose:  Not Given


Albuterol Sulfate (Albuterol 2.5mg/3ml Neb Ud)  1.25 mg HHN Q2HRT PRN


   PRN Reason: Shortness of Breath


   Stop: 18 02:12


   Last Admin: 18 07:39 Dose:  1.25 mg


Ascorbic Acid (Vitamin C)  500 mg PO DAILY [facility]


   Stop: 18 08:59


   Last Admin: 18 08:34 Dose:  Not Given


Atorvastatin Calcium (Lipitor)  10 mg PO DAILY [facility]; Protocol


   Stop: 18 08:59


   Last Admin: 18 08:34 Dose:  Not Given


Carvedilol (Coreg)  6.25 mg PO BID [facility]


   Stop: 18 16:59


   Last Admin: 18 08:35 Dose:  Not Given


Clopidogrel Bisulfate (Plavix)  75 mg PO DAILY [facility]


   Stop: 18 08:59


   Last Admin: 18 08:35 Dose:  Not Given


Diltiazem HCl (Cardizem)  60 mg PO Q6HR [facility]


   Stop: 18 17:59


   Last Admin: 18 11:19 Dose:  Not Given


Diltiazem HCl (Cardizem)  5 mg IVP Q6H PRN


   PRN Reason: HR more than 125bpm


   Stop: 18 03:29


Docusate Sodium (Colace)  100 mg PO BID [facility]


   Stop: 18 08:59


   Last Admin: 18 08:35 Dose:  Not Given


Donepezil HCl (Aricept)  10 mg PO HS [facility]


   Stop: 18 21:59


   Last Admin: 18 01:23 Dose:  10 mg


Enoxaparin Sodium 80 mg/ (Enoxaparin Sodium 40 mg)  120 mg SUBQ Q24H [facility]


   Stop: 18 15:59


   Last Admin: 18 15:55 Dose:  120 mg


Furosemide (Lasix)  40 mg IVP BID [facility]


   Stop: 18 16:59


   Last Admin: 18 08:35 Dose:  Not Given


Gabapentin (Neurontin)  600 mg PO Q6HR [facility]


   Stop: 18 05:59


   Last Admin: 18 11:20 Dose:  Not Given


Hydromorphone HCl (Dilaudid)  1 mg IVP Q6H PRN


   PRN Reason: Pain (Moderate)


   Stop: 18 16:38


   Last Admin: 18 03:50 Dose:  1 mg


Hydromorphone HCl (Dilaudid)  2 mg IVP Q6H PRN


   PRN Reason: SEVERE PAIN


   Stop: 18 21:10


   Last Admin: 18 11:16 Dose:  2 mg


Levofloxacin (Levaquin Pb)  250 mg in 50 mls @ 50 mls/hr IV Q24HR [facility]


   Stop: 18 00:59


   Last Infusion: 18 02:20 Dose:  Infused


Metronidazole (Flagyl)  500 mg in 100 mls @ 100 mls/hr IV Q12HR [facility]


   Stop: 18 20:59


   Last Infusion: 18 09:05 Dose:  Infused


Amikacin Sulfate 375 mg/ (Dextrose)  101.5 mls @ 100 mls/hr IV Q48HR [facility]


   Stop: 18 08:59


   Last Infusion: 18 09:30 Dose:  Infused


Multivitamins/Minerals 10 ml/Dextrose/ Amino Acids/Electrolytes  1,200 mls @ 50 

mls/hr IV .Q24H [facility]


   Stop: 18 16:59


   Last Admin: 18 16:03 Dose:  50 mls/hr


Insulin Aspart (Novolog Insulin Sliding Scale)  0 units SUBQ Q6HR [facility]; Protocol


   Stop: 18 17:59


   Last Admin: 18 11:18 Dose:  Not Given


Levothyroxine Sodium (Synthroid)  0.025 mg PO DAILY@0730 [facility]


   Stop: 18 07:29


   Last Admin: 18 06:34 Dose:  Not Given


Lorazepam (Ativan)  0.5 mg IVP Q6HR [facility]; Protocol


   PRN Reason: Agitation


   Stop: 18 11:03


   Last Admin: 18 08:20 Dose:  0.5 mg


Miscellaneous (Amikacin Iv Per Pharmacy)  1 ea  PRN PRN


   PRN Reason: Protocol


   Stop: 18 15:52


Miscellaneous (Lovenox Subq Per Pharmacy)  1 [facility] PRN PRN


   PRN Reason: PROTOCOL


   Stop: 18 11:40


Miscellaneous (Ppn Per Pharmacy)  1 [facility] PRN PRN


   PRN Reason: PROTOCOL


   Stop: 18 10:04


Montelukast Sodium (Singulair)  10 mg PO DAILY [facility]


   Stop: 18 08:59


   Last Admin: 18 08:36 Dose:  Not Given


Morphine Sulfate (Morphine)  1 mg IVP Q4HR PRN


   PRN Reason: Moderate Pain


   Stop: 18 21:35


   Last Admin: 18 03:48 Dose:  1 mg


Ondansetron HCl (Zofran)  4 mg IV Q6H PRN


   PRN Reason: Nausea / Vomiting


   Stop: 18 21:34


   Last Admin: 18 16:26 Dose:  4 mg


Pantoprazole Sodium (Protonix)  40 mg IVP DAILY [facility]


   Stop: 18 08:59


   Last Admin: 18 08:28 Dose:  40 mg


Prednisone (Deltasone)  5 mg PO DAILY [facility]


   Stop: 18 08:59


   Last Admin: 18 08:36 Dose:  Not Given








General: Mild distress (confused)


Neck: Supple


Cardiovascular: Regular rate


Lungs: Clear to auscultation


Abdomen: Bowel sounds, Soft, Tender (LLQ), Distended (MILD)





Assessment/Plan





- Assessment


Assessment: 





abdominal pain


gastroenteritis


sigmoid diverticulitis


possible ischemic colitis with cholelithiasis and hida suggest chloecysitis 


cad


copd 


pud


gerd


dementia








- Plan


Plan: 





iv vanco as per id


surgery eval per cholecystitis 


as per GI


cbc/bmp in am


continue the rest of the orders 





Nutritional Asmnt/Malnutr-PDOC





- Dietary Evaluation


Malnutrition Findings (Please click <Entered> for more info): 








Nutritional Asmnt/Malnutrition                             Start:  18 14:

47


Text:                                                      Status: Complete    

  


Freq:                                                                          

  


Protocol:                                                                      

  


 Document     18 14:47  LCHENG  (Rec: 18 15:32  [email or web address]  [id number])


 Nutritional Asmnt/Malnutrition


     Patient General Information


      Nutritional Screening                      High Risk


      Diagnosis                                  elevated troponin, hypoxia,


                                                 possible PNA


      Pertinent Medical Hx/Surgical Hx           HTN, CAD, asthma/COPD, PUD/


                                                 GERD, ESRD, dementia


      Subjective Information                     Pt seen on mask at time of


                                                 visit. Pt was NPO for CT


                                                 scheduled today, no PO intake


                                                 information available.


      Current Diet Order/ Nutrition Support      NPO


      Pertinent Medications                      vit C, colace, levaquin,


                                                 synthorid, zofran, nacl 0.9%,


                                                 piperacillin, protonix


      Pertinent Labs                              Na 135, Glucose 190, A1c


                                                 5.1


     Nutritional Hx/Data


      Height                                     1.63 m


      Height (Calculated Centimeters)            162.6


      Current Weight (lbs)                       73.936 kg


      Weight (Calculated Kilograms)              73.9


      Weight (Calculated Grams)                  96535.6


      Ideal Body Weight                          120


      Body Mass Index (BMI)                      27.9


      Weight Status                              Overweight


     GI Symptoms


      GI Symptoms                                None


      Last BM                                    none


      Difficult in:                              None


      Skin Integrity/Comment:                    reddened to coccyx, buttocks


     Estimated Nutritional Goals


      BEE in Kcals:                              Using Current wt


      Calories/Kcals/Kg                          23-27


      Kcals Calculated                           9393-3974


      Protein g/k


      Protein Calculated                         74


      Fluid: ml                                  1702-1998ml (1ml/kcal)


     Nutritional Problem


      1. Problem


       Problem                                   altered nutrition related labs


       Etiology                                  hyperglycemia


       Signs/Symptoms:                           glucose 190 at admission


     Malnutrition Alert


      Is there a minimum of two criteria         No


       selected?                                 


       Query Text:Check all the applicable       


       criteria. A minimum of two criteria are   


       recommended for diagnosis of either       


       severe or non-severe malnutrition.        


     Malnutrition Related to Morbid Obesity


      Malnutrition related to morbid obesity     No


     Intervention/Recommendation


      Comments                                   1. Resume pureed JOJO diet


                                                 after CT exam. Assist pt with


                                                 meals. If glucose continue


                                                 high, will consider adding


                                                 CCHO diet.


                                                 2. Monitor PO intake, wt, labs


                                                 and skin integrity


                                                 3. F/U as high risk in 2-3


                                                 days, -


     Expected Outcomes/Goals


      Expected Outcomes/Goals                    1. PO intake to meet at least


                                                 75% of nutritional needs.


                                                 2. Wt stability, skin to


                                                 remain intact, labs to


                                                 approach WNL.

## 2018-07-05 NOTE — GI PROGRESS NOTE
Subjective





- Review of Systems


Subjective: 





NO EVENTS


PER STAFF FAMILY WISHES COMFORT CARE





Objective





- Results


Result Diagrams: 


 07/05/18 06:40





 07/05/18 05:00


Recent Labs: 


 Laboratory Last Values











WBC  14.6 Th/cmm (4.8-10.8)  H  07/05/18  06:40    


 


RBC  4.16 Mil/cmm (3.80-5.20)   07/05/18  06:40    


 


Hgb  12.8 gm/dL (12-16)   07/05/18  06:40    


 


Hct  38.7 % (41.0-60)  L  07/05/18  06:40    


 


MCV  93.1 fl ()   07/05/18  06:40    


 


MCH  30.7 pg (27.0-31.0)   07/05/18  06:40    


 


MCHC Differential  33.0 pg (28.0-36.0)   07/05/18  06:40    


 


RDW  16.2 % (11.5-20.0)   07/05/18  06:40    


 


Plt Count  140 Th/cmm (150-400)  L  07/05/18  06:40    


 


MPV  8.0 fl  07/05/18  06:40    


 


Neutrophils %  73.6 % (40.0-80.0)   07/05/18  06:40    


 


Lymphocytes %  15.4 % (20.0-50.0)  L  07/05/18  06:40    


 


Monocytes %  10.1 % (2.0-10.0)  H  07/05/18  06:40    


 


Eosinophils %  0.8 % (0.0-5.0)   07/05/18  06:40    


 


Basophils %  0.1 % (0.0-2.0)   07/05/18  06:40    


 


PT  15.5 SECONDS (9.5-11.5)  H  06/30/18  11:49    


 


INR  1.46  (0.5-1.4)  H  06/30/18  11:49    


 


PTT (Actin FS)  34.3 SECONDS (26.0-38.0)   06/30/18  11:49    


 


Specimen Source  arterial   06/27/18  18:58    


 


Sample Site  rr   06/27/18  18:58    


 


pH  7.38  (7.35-7.45)   06/27/18  18:58    


 


pCO2  41.0 mmHg (35.0-45.0)   06/27/18  18:58    


 


pO2  44.0 mmHg (80.0-100.0)  L*  06/27/18  18:58    


 


HCO3  23.9 mEq/L (20.0-26.0)   06/27/18  18:58    


 


Base Excess  -0.8 mEq/L (-3.0-3.0)   06/27/18  18:58    


 


O2 Saturation  79.0 % (92.0-100.0)  L  06/27/18  18:58    


 


Magdaleno Test  y   06/27/18  18:58    


 


Vent Rate  N/A   06/27/18  18:58    


 


Inspired O2  36   06/27/18  18:58    


 


Tidal Volume  N/A   06/27/18  18:58    


 


PEEP  N/A   06/27/18  18:58    


 


Pressure (ins/psv/peep)  N/A   06/27/18  18:58    


 


Critical Value  MM/JY   06/27/18  18:58    


 


Sodium  140 mEq/L (136-145)   07/05/18  05:00    


 


Potassium  4.0 mEq/L (3.5-5.1)   07/05/18  05:00    


 


Chloride  116 mEq/L ()  H  07/05/18  05:00    


 


Carbon Dioxide  15.1 mEq/L (21.0-31.0)  L  07/05/18  05:00    


 


Anion Gap  12.9  (7.0-16.0)   07/05/18  05:00    


 


BUN  55 mg/dL (7-25)  H  07/05/18  05:00    


 


Creatinine  2.2 mg/dL (0.6-1.2)  H  07/05/18  05:00    


 


Est GFR ( Amer)  TNP   07/05/18  05:00    


 


Est GFR (Non-Af Amer)  TNP   07/05/18  05:00    


 


BUN/Creatinine Ratio  25.0   07/05/18  05:00    


 


Glucose  137 mg/dL ()  H  07/05/18  05:00    


 


POC Glucose  135 MG/DL (70 - 105)  H  07/05/18  00:16    


 


Hemoglobin A1c %  5.1 % (4.0-6.0)   06/27/18  18:40    


 


Calcium  7.5 mg/dL (8.6-10.3)  L  07/05/18  05:00    


 


Phosphorus  3.4 mg/dL (2.5-5.0)   07/05/18  05:00    


 


Magnesium  2.1 mg/dL (1.9-2.7)   07/05/18  05:00    


 


Total Bilirubin  0.7 mg/dL (0.3-1.0)   07/05/18  05:00    


 


Direct Bilirubin  0.24 mg/dL (0.0-0.2)  H  07/02/18  04:30    


 


AST  27 U/L (13-39)   07/05/18  05:00    


 


ALT  173 U/L (7-52)  H  07/05/18  05:00    


 


Alkaline Phosphatase  40 U/L ()   07/05/18  05:00    


 


Creatine Kinase  21 U/L ()  L  06/27/18  18:40    


 


Troponin I  0.80 ng/mL (0.01-0.05)  H* D 07/03/18  05:50    


 


B-Natriuretic Peptide  > 5000.0 pg/mL (5.0-100.0)  H  07/04/18  05:10    


 


Total Protein  4.8 gm/dL (6.0-8.3)  L  07/05/18  05:00    


 


Albumin  2.1 gm/dL (3.7-5.3)  L  07/05/18  05:00    


 


Globulin  2.7 gm/dL  07/05/18  05:00    


 


Albumin/Globulin Ratio  0.8  (1.0-1.8)  L  07/05/18  05:00    


 


Triglycerides  149 mg/dL (<150)   06/30/18  12:37    


 


Cholesterol  84 mg/dL (<200)   06/30/18  12:37    


 


LDL Cholesterol Direct  47 mg/dL ()  L  06/30/18  12:37    


 


HDL Cholesterol  18 mg/dL (23-92)  L  06/30/18  12:37    


 


Amylase  12 U/L ()  L  06/27/18  18:40    


 


Lipase  6 U/L (11-82)  L  06/27/18  18:40    


 


TSH  1.72 uIU/ml (0.34-5.60)   06/30/18  09:59    


 


Urine Source  CATH   06/28/18  05:05    


 


Urine Color  YELLOW   06/28/18  05:05    


 


Urine Clarity  TURBID  (CLEAR)  H  06/28/18  05:05    


 


Urine pH  5.5  (4.6 - 8.0)   06/28/18  05:05    


 


Ur Specific Gravity  1.020  (1.005-1.030)   06/28/18  05:05    


 


Urine Protein  TRACE mg/dL (NEGATIVE)   06/28/18  05:05    


 


Urine Glucose (UA)  NEGATIVE mg/dL (NEGATIVE)   06/28/18  05:05    


 


Urine Ketones  15 mg/dL (NEGATIVE)  H  06/28/18  05:05    


 


Urine Blood  NEGATIVE  (NEGATIVE)   06/28/18  05:05    


 


Urine Nitrate  POSITIVE  (NEGATIVE)  H  06/28/18  05:05    


 


Urine Bilirubin  NEGATIVE  (NEGATIVE)   06/28/18  05:05    


 


Urine Urobilinogen  1.0 E.U./dL (0.2 - 1.0)   06/28/18  05:05    


 


Ur Leukocyte Esterase  MODERATE  (NEGATIVE)  H  06/28/18  05:05    


 


Urine RBC  0-2 /hpf (0-5)   06/28/18  05:05    


 


Urine WBC   /hpf (0-5)  H  06/28/18  05:05    


 


Ur Epithelial Cells  MODERATE /lpf (FEW)   06/28/18  05:05    


 


Urine Bacteria  4+ /hpf (NONE SEEN)  H  06/28/18  05:05    


 


Amikacin Peak  20.9 ug/mL (20.0-30.0)   07/03/18  12:21    


 


Amikacin Trough  4.3 ug/mL (1.0-8.0)   07/03/18  05:50    


 


Acetaminophen  < 10.0 ug/mL (10.0-30.0)  L  07/02/18  04:30    














- Physical Exam


Vitals and I&O: 


 Vital Signs











Temp  98.1 F   07/05/18 04:00


 


Pulse  85   07/05/18 07:00


 


Resp  29   07/05/18 07:00


 


BP  97/28   07/05/18 07:00


 


Pulse Ox  99   07/05/18 07:00








 Intake & Output











 07/04/18 07/05/18 07/05/18





 18:59 06:59 18:59


 


Intake Total 1300 150 


 


Balance 1300 150 


 


Weight (lbs) 79.464 kg 79.379 kg 


 


Intake:   


 


  Intake, IV Amount 1300 150 


 


    Levofloxacin 250mg/50mL  50 





    250 mg In 50 ml @ 50 mls/   





    hr IV Q24HR [facility] Rx#:   





    [id number]   


 


    Multivitamin Inj 10 ml In 1200  





    Dextrose 70% 690 ml In   





    Amino Acids 8.5% 500 ml @   





    50 mls/hr IV .Q24H [facility]   





    Rx#:[id number]   


 


    metroNIDAZOLE 500mg/ 100 





    100mL 500 mg In 100 ml @   





    100 mls/hr IV Q12HR [facility]   





    Rx#:[id number]   


 


Other:   


 


  # Voids 3 3 


 


  # Bowel Movements 1  


 


  Stool Characteristics Soft Liquid 





 Formed Brown 





 Brown  


 


  Weight Source Bedscale Bedscale 











Active Medications: 


Current Medications





Acetaminophen (Tylenol)  650 mg PO Q6H PRN


   PRN Reason: Pain (Mild)


   Stop: 08/27/18 02:01


Acetaminophen/Hydrocodone Bitart (Norco 5mg/325mg)  1 tab PO Q6H [facility]


   Stop: 08/26/18 21:29


   Last Admin: 07/05/18 01:23 Dose:  Not Given


Albuterol Sulfate (Albuterol 2.5mg/3ml Neb Ud)  1.25 mg HHN Q2HRT PRN


   PRN Reason: Shortness of Breath


   Stop: 08/27/18 02:12


   Last Admin: 07/03/18 07:39 Dose:  1.25 mg


Ascorbic Acid (Vitamin C)  500 mg PO DAILY [facility]


   Stop: 08/27/18 08:59


   Last Admin: 07/04/18 08:59 Dose:  Not Given


Atorvastatin Calcium (Lipitor)  10 mg PO DAILY [facility]; Protocol


   Stop: 08/30/18 08:59


   Last Admin: 07/04/18 08:59 Dose:  Not Given


Carvedilol (Coreg)  6.25 mg PO BID [facility]


   Stop: 08/28/18 16:59


   Last Admin: 07/04/18 16:20 Dose:  Not Given


Clopidogrel Bisulfate (Plavix)  75 mg PO DAILY [facility]


   Stop: 08/27/18 08:59


   Last Admin: 07/04/18 09:00 Dose:  Not Given


Diltiazem HCl (Cardizem)  60 mg PO Q6HR [facility]


   Stop: 08/28/18 17:59


   Last Admin: 07/05/18 06:36 Dose:  Not Given


Diltiazem HCl (Cardizem)  5 mg IVP Q6H PRN


   PRN Reason: HR more than 125bpm


   Stop: 08/30/18 03:29


Docusate Sodium (Colace)  100 mg PO BID [facility]


   Stop: 08/27/18 08:59


   Last Admin: 07/04/18 16:21 Dose:  Not Given


Donepezil HCl (Aricept)  10 mg PO HS [facility]


   Stop: 08/26/18 21:59


   Last Admin: 07/05/18 01:23 Dose:  10 mg


Enoxaparin Sodium 80 mg/ (Enoxaparin Sodium 40 mg)  120 mg SUBQ Q24H [facility]


   Stop: 09/01/18 15:59


   Last Admin: 07/04/18 16:40 Dose:  120 mg


Furosemide (Lasix)  40 mg IVP BID [facility]


   Stop: 09/01/18 16:59


   Last Admin: 07/04/18 16:15 Dose:  40 mg


Gabapentin (Neurontin)  600 mg PO Q6HR [facility]


   Stop: 08/27/18 05:59


   Last Admin: 07/05/18 06:32 Dose:  Not Given


Hydromorphone HCl (Dilaudid)  1 mg IVP Q6H PRN


   PRN Reason: Pain (Moderate)


   Stop: 08/28/18 16:38


   Last Admin: 07/05/18 03:50 Dose:  1 mg


Hydromorphone HCl (Dilaudid)  2 mg IVP Q6H PRN


   PRN Reason: SEVERE PAIN


   Stop: 08/28/18 21:10


   Last Admin: 07/04/18 16:15 Dose:  2 mg


Levofloxacin (Levaquin Pb)  250 mg in 50 mls @ 50 mls/hr IV Q24HR [facility]


   Stop: 08/27/18 00:59


   Last Infusion: 07/05/18 02:20 Dose:  Infused


Metronidazole (Flagyl)  500 mg in 100 mls @ 100 mls/hr IV Q12HR [facility]


   Stop: 08/29/18 20:59


   Last Infusion: 07/04/18 22:15 Dose:  Infused


Amikacin Sulfate 375 mg/ (Dextrose)  101.5 mls @ 100 mls/hr IV Q48HR [facility]


   Stop: 08/30/18 08:59


   Last Infusion: 07/03/18 11:05 Dose:  Infused


Multivitamins/Minerals 10 ml/Dextrose/ Amino Acids/Electrolytes  1,200 mls @ 50 

mls/hr IV .Q24H [facility]


   Stop: 08/01/18 16:59


   Last Admin: 07/04/18 16:16 Dose:  50 mls/hr


Insulin Aspart (Novolog Insulin Sliding Scale)  0 units SUBQ Q6HR [facility]; Protocol


   Stop: 08/30/18 17:59


   Last Admin: 07/05/18 06:40 Dose:  Not Given


Levothyroxine Sodium (Synthroid)  0.025 mg PO DAILY@0730 [facility]


   Stop: 08/27/18 07:29


   Last Admin: 07/05/18 06:34 Dose:  Not Given


Lorazepam (Ativan)  0.5 mg IVP Q6HR [facility]; Protocol


   PRN Reason: Agitation


   Stop: 08/29/18 11:03


   Last Admin: 07/05/18 01:44 Dose:  0.5 mg


Miscellaneous (Amikacin Iv Per Pharmacy)  1 [facility] PRN PRN


   PRN Reason: Protocol


   Stop: 08/29/18 15:52


Miscellaneous (Lovenox Subq Per Pharmacy)  1 [facility] PRN PRN


   PRN Reason: PROTOCOL


   Stop: 08/29/18 11:40


Miscellaneous (Ppn Per Pharmacy)  1 [facility] PRN PRN


   PRN Reason: PROTOCOL


   Stop: 08/30/18 10:04


Montelukast Sodium (Singulair)  10 mg PO DAILY [facility]


   Stop: 08/27/18 08:59


   Last Admin: 07/04/18 09:00 Dose:  Not Given


Morphine Sulfate (Morphine)  1 mg IVP Q4HR PRN


   PRN Reason: Moderate Pain


   Stop: 08/26/18 21:35


   Last Admin: 07/02/18 03:48 Dose:  1 mg


Ondansetron HCl (Zofran)  4 mg IV Q6H PRN


   PRN Reason: Nausea / Vomiting


   Stop: 08/26/18 21:34


   Last Admin: 06/29/18 16:26 Dose:  4 mg


Pantoprazole Sodium (Protonix)  40 mg IVP DAILY [facility]


   Stop: 08/30/18 08:59


   Last Admin: 07/04/18 08:50 Dose:  40 mg


Prednisone (Deltasone)  5 mg PO DAILY [facility]


   Stop: 08/27/18 08:59


   Last Admin: 07/04/18 09:01 Dose:  Not Given








General: Mild distress (confused)


Neck: Supple


Cardiovascular: Regular rate


Lungs: Clear to auscultation


Abdomen: Bowel sounds, Soft, Tender (LLQ), Distended (MILD)





Assessment/Plan





- Assessment


Assessment: 





86 YO FEMALE WITH SIGMOID DIVERTICULITIS.  MAY ALSO HAVE ISCHEMIC COLITIS


ALSO WITH CHOLELITHIASIS AND HIDA SUGGEST CHOLECYSTITIS - LFTS IMPROVING


 


1. CONT ABX.


2. NPO AND TPN.


3. PER CARDS TO OPTIMIZE CARDIAC FUNCTION.


4. FAILED SWALLOW EVAL.  POLST OUTLINED WISH FOR DNR AND NO ARTIFICIAL FEEDING.


5. SURGERY EVAL PER HOSPITALIST FOR CHOLECYSTITIS


6. WILL SEE AS NEEDED; CALL IF QUESTIONS

## 2018-07-06 LAB
ALBUMIN SERPL-MCNC: 2 GM/DL (ref 3.7–5.3)
ALBUMIN/GLOB SERPL: 0.7 {RATIO} (ref 1–1.8)
ALP SERPL-CCNC: 38 U/L (ref 34–104)
ALT SERPL-CCNC: 111 U/L (ref 7–52)
ANION GAP SERPL CALC-SCNC: 6.6 MMOL/L (ref 7–16)
AST SERPL-CCNC: 19 U/L (ref 13–39)
BILIRUB SERPL-MCNC: 0.8 MG/DL (ref 0.3–1)
BUN SERPL-MCNC: 62 MG/DL (ref 7–25)
CALCIUM SERPL-MCNC: 7.7 MG/DL (ref 8.6–10.3)
CHLORIDE SERPL-SCNC: 113 MEQ/L (ref 98–107)
CO2 SERPL-SCNC: 20.9 MEQ/L (ref 21–31)
CREAT SERPL-MCNC: 2.4 MG/DL (ref 0.6–1.2)
GLOBULIN SER-MCNC: 3.1 GM/DL
GLUCOSE SERPL-MCNC: 151 MG/DL (ref 70–105)
MAGNESIUM SERPL-MCNC: 2 MG/DL (ref 1.9–2.7)
PHOSPHATE SERPL-MCNC: 3.4 MG/DL (ref 2.5–5)
POTASSIUM SERPL-SCNC: 3.5 MEQ/L (ref 3.5–5.1)
SODIUM SERPL-SCNC: 137 MEQ/L (ref 136–145)

## 2018-07-06 RX ADMIN — DILTIAZEM HYDROCHLORIDE SCH: 30 TABLET, FILM COATED ORAL at 06:34

## 2018-07-06 RX ADMIN — METRONIDAZOLE SCH: 500 INJECTION, SOLUTION INTRAVENOUS at 08:57

## 2018-07-06 RX ADMIN — HYDROCODONE BITARTRATE AND ACETAMINOPHEN SCH: 5; 325 TABLET ORAL at 04:13

## 2018-07-06 RX ADMIN — INSULIN ASPART SCH: 100 INJECTION, SOLUTION INTRAVENOUS; SUBCUTANEOUS at 03:20

## 2018-07-06 RX ADMIN — HYDROMORPHONE HYDROCHLORIDE PRN MG: 2 INJECTION INTRAMUSCULAR; INTRAVENOUS; SUBCUTANEOUS at 08:40

## 2018-07-06 RX ADMIN — INSULIN ASPART SCH: 100 INJECTION, SOLUTION INTRAVENOUS; SUBCUTANEOUS at 12:55

## 2018-07-06 RX ADMIN — HYDROCODONE BITARTRATE AND ACETAMINOPHEN SCH: 5; 325 TABLET ORAL at 09:30

## 2018-07-06 RX ADMIN — DILTIAZEM HYDROCHLORIDE SCH: 30 TABLET, FILM COATED ORAL at 12:53

## 2018-07-06 RX ADMIN — LEVOTHYROXINE SODIUM SCH: 100 TABLET ORAL at 06:34

## 2018-07-06 RX ADMIN — LEVOFLOXACIN SCH MLS/HR: 5 INJECTION INTRAVENOUS at 01:17

## 2018-07-06 RX ADMIN — DILTIAZEM HYDROCHLORIDE SCH: 30 TABLET, FILM COATED ORAL at 04:11

## 2018-07-06 RX ADMIN — INSULIN ASPART SCH: 100 INJECTION, SOLUTION INTRAVENOUS; SUBCUTANEOUS at 04:11

## 2018-07-06 RX ADMIN — HYDROMORPHONE HYDROCHLORIDE PRN MG: 2 INJECTION INTRAMUSCULAR; INTRAVENOUS; SUBCUTANEOUS at 01:18

## 2018-07-10 NOTE — DISCHARGE SUMMARY
DATE OF DISCHARGE:  07/06/2018



HISTORY AND HOSPITAL COURSE:  Unfortunate 87-year-old female patient who was

recently discharged from [email or web address]], was admitted to [facility] on 06/27/2018.  The patient was discharged on 07/06/2018 to

hospice care and the patient initially was admitted for severe abdominal pain,

fever, chills and also had chest pain.  The patient has a history of ____

hypertension, Sjogren syndrome, hypoxia, dementia, diabetes 2, CKD and

osteoporosis ____.  The patient was treated for all of that and the patient was

in ICU for 2 days but family has decided to make him a no code in the middle of

the treatment and so the patient was made DNR.  The patient was discharged to

[facility] under the care of primary doctors where they will be

following the patient.  Condition at the time of discharge is unstable ____.





DD: 07/09/2018 17:15

DT: 07/10/2018 11:20

JOB# [id number]  [id number]

## 2024-10-23 NOTE — GI PROGRESS NOTE
Subjective





- Review of Systems


Service Date: 06/30/18


Subjective: 





GI NOTE





CONFUSED


C/O ABD PAIN.


NPO.


IN ICU FOR ELEVATED TROPONINS.





Objective





- Results


Result Diagrams: 


 06/30/18 09:59





 06/30/18 09:59


Recent Labs: 


 Laboratory Last Values











WBC  10.8 Th/cmm (4.8-10.8)   06/30/18  09:59    


 


RBC  4.07 Mil/cmm (3.80-5.20)   06/30/18  09:59    


 


Hgb  12.6 gm/dL (12-16)   06/30/18  09:59    


 


Hct  37.3 % (41.0-60)  L  06/30/18  09:59    


 


MCV  91.9 fl ()   06/30/18  09:59    


 


MCH  31.1 pg (27.0-31.0)  H  06/30/18  09:59    


 


MCHC Differential  33.8 pg (28.0-36.0)   06/30/18  09:59    


 


RDW  14.3 % (11.5-20.0)   06/30/18  09:59    


 


Plt Count  199 Th/cmm (150-400)   06/30/18  09:59    


 


MPV  7.7 fl  06/30/18  09:59    


 


Neutrophils %  78.3 % (40.0-80.0)   06/30/18  09:59    


 


Lymphocytes %  15.6 % (20.0-50.0)  L  06/30/18  09:59    


 


Monocytes %  5.9 % (2.0-10.0)   06/30/18  09:59    


 


Eosinophils %  0.1 % (0.0-5.0)   06/30/18  09:59    


 


Basophils %  0.1 % (0.0-2.0)   06/30/18  09:59    


 


PT  15.5 SECONDS (9.5-11.5)  H  06/30/18  11:49    


 


INR  1.46  (0.5-1.4)  H  06/30/18  11:49    


 


PTT (Actin FS)  34.3 SECONDS (26.0-38.0)   06/30/18  11:49    


 


Specimen Source  arterial   06/27/18  18:58    


 


Sample Site  rr   06/27/18  18:58    


 


pH  7.38  (7.35-7.45)   06/27/18  18:58    


 


pCO2  41.0 mmHg (35.0-45.0)   06/27/18  18:58    


 


pO2  44.0 mmHg (80.0-100.0)  L*  06/27/18  18:58    


 


HCO3  23.9 mEq/L (20.0-26.0)   06/27/18  18:58    


 


Base Excess  -0.8 mEq/L (-3.0-3.0)   06/27/18  18:58    


 


O2 Saturation  79.0 % (92.0-100.0)  L  06/27/18  18:58    


 


Magdaleno Test  y   06/27/18  18:58    


 


Vent Rate  N/A   06/27/18  18:58    


 


Inspired O2  36   06/27/18  18:58    


 


Tidal Volume  N/A   06/27/18  18:58    


 


PEEP  N/A   06/27/18  18:58    


 


Pressure (ins/psv/peep)  N/A   06/27/18  18:58    


 


Critical Value  MM/JY   06/27/18  18:58    


 


Sodium  139 mEq/L (136-145)   06/30/18  09:59    


 


Potassium  4.5 mEq/L (3.5-5.1)   06/30/18  09:59    


 


Chloride  111 mEq/L ()  H  06/30/18  09:59    


 


Carbon Dioxide  18.0 mEq/L (21.0-31.0)  L  06/30/18  09:59    


 


Anion Gap  14.5  (7.0-16.0)   06/30/18  09:59    


 


BUN  37 mg/dL (7-25)  H  06/30/18  09:59    


 


Creatinine  1.9 mg/dL (0.6-1.2)  H  06/30/18  09:59    


 


Est GFR ( Amer)  TNP   06/30/18  09:59    


 


Est GFR (Non-Af Amer)  TNP   06/30/18  09:59    


 


BUN/Creatinine Ratio  19.5   06/30/18  09:59    


 


Glucose  137 mg/dL ()  H  06/30/18  09:59    


 


Hemoglobin A1c %  5.1 % (4.0-6.0)   06/27/18  18:40    


 


Calcium  7.6 mg/dL (8.6-10.3)  L  06/30/18  09:59    


 


Total Bilirubin  0.7 mg/dL (0.3-1.0)   06/30/18  09:59    


 


AST  2915 U/L (13-39)  H  06/30/18  09:59    


 


ALT  1097 U/L (7-52)  H  06/30/18  09:59    


 


Alkaline Phosphatase  89 U/L ()   06/30/18  09:59    


 


Creatine Kinase  21 U/L ()  L  06/27/18  18:40    


 


Troponin I  7.97 ng/mL (0.01-0.05)  H* D 06/30/18  12:37    


 


B-Natriuretic Peptide  163.0 pg/mL (5.0-100.0)  H  06/27/18  18:40    


 


Total Protein  5.5 gm/dL (6.0-8.3)  L  06/30/18  09:59    


 


Albumin  2.6 gm/dL (3.7-5.3)  L  06/30/18  09:59    


 


Globulin  2.9 gm/dL  06/30/18  09:59    


 


Albumin/Globulin Ratio  0.9  (1.0-1.8)  L  06/30/18  09:59    


 


Triglycerides  149 mg/dL (<150)   06/30/18  12:37    


 


Cholesterol  84 mg/dL (<200)   06/30/18  12:37    


 


LDL Cholesterol Direct  47 mg/dL ()  L  06/30/18  12:37    


 


HDL Cholesterol  18 mg/dL (23-92)  L  06/30/18  12:37    


 


Amylase  12 U/L ()  L  06/27/18  18:40    


 


Lipase  6 U/L (11-82)  L  06/27/18  18:40    


 


TSH  1.72 uIU/ml (0.34-5.60)   06/30/18  09:59    


 


Urine Source  CATH   06/28/18  05:05    


 


Urine Color  YELLOW   06/28/18  05:05    


 


Urine Clarity  TURBID  (CLEAR)  H  06/28/18  05:05    


 


Urine pH  5.5  (4.6 - 8.0)   06/28/18  05:05    


 


Ur Specific Gravity  1.020  (1.005-1.030)   06/28/18  05:05    


 


Urine Protein  TRACE mg/dL (NEGATIVE)   06/28/18  05:05    


 


Urine Glucose (UA)  NEGATIVE mg/dL (NEGATIVE)   06/28/18  05:05    


 


Urine Ketones  15 mg/dL (NEGATIVE)  H  06/28/18  05:05    


 


Urine Blood  NEGATIVE  (NEGATIVE)   06/28/18  05:05    


 


Urine Nitrate  POSITIVE  (NEGATIVE)  H  06/28/18  05:05    


 


Urine Bilirubin  NEGATIVE  (NEGATIVE)   06/28/18  05:05    


 


Urine Urobilinogen  1.0 E.U./dL (0.2 - 1.0)   06/28/18  05:05    


 


Ur Leukocyte Esterase  MODERATE  (NEGATIVE)  H  06/28/18  05:05    


 


Urine RBC  0-2 /hpf (0-5)   06/28/18  05:05    


 


Urine WBC   /hpf (0-5)  H  06/28/18  05:05    


 


Ur Epithelial Cells  MODERATE /lpf (FEW)   06/28/18  05:05    


 


Urine Bacteria  4+ /hpf (NONE SEEN)  H  06/28/18  05:05    














- Physical Exam


Vitals and I&O: 


 Vital Signs











Temp  97.5 F   06/30/18 15:00


 


Pulse  104   06/30/18 16:36


 


Resp  15   06/30/18 15:00


 


BP  93/51   06/30/18 16:36


 


Pulse Ox  97   06/30/18 15:00








 Intake & Output











 06/29/18 06/30/18 06/30/18





 18:59 06:59 18:59


 


Intake Total 1050 2050 


 


Balance 1050 2050 


 


Weight (lbs) 75.296 kg 74.843 kg 


 


Intake:   


 


  Intake, IV Amount 1000 2050 


 


    Levofloxacin 250mg/50mL  50 





    250 mg In 50 ml @ 50 mls/   





    hr IV Q24HR [facility] Rx#:   





    [id number]   


 


    Sodium Chloride 0.9% 1, 1000 2000 





    000 ml @ 125 mls/hr IV .   





    Q8H [facility] Rx#:[id number]   


 


  Oral 50 0 


 


Other:   


 


  # Voids 2 3 


 


  # Bowel Movements 0 1 


 


  Stool Characteristics Formed  


 


  Weight Source Bedscale Bedscale 











Active Medications: 


Current Medications





Acetaminophen (Tylenol)  650 mg PO Q6H PRN


   PRN Reason: Pain (Mild)


   Stop: 08/27/18 02:01


Acetaminophen/Hydrocodone Bitart (Norco 5mg/325mg)  1 tab PO Q6H [facility]


   Stop: 08/26/18 21:29


   Last Admin: 06/30/18 15:26 Dose:  Not Given


Albuterol Sulfate (Albuterol 2.5mg/3ml Neb Ud)  1.25 mg HHN Q2HRT PRN


   PRN Reason: Shortness of Breath


   Stop: 08/27/18 02:12


   Last Admin: 06/28/18 07:14 Dose:  1.25 mg


Ascorbic Acid (Vitamin C)  500 mg PO DAILY [facility]


   Stop: 08/27/18 08:59


   Last Admin: 06/30/18 09:02 Dose:  Not Given


Carvedilol (Coreg)  6.25 mg PO BID [facility]


   Stop: 08/28/18 16:59


   Last Admin: 06/30/18 16:36 Dose:  Not Given


Clopidogrel Bisulfate (Plavix)  75 mg PO DAILY [facility]


   Stop: 08/27/18 08:59


   Last Admin: 06/30/18 09:03 Dose:  Not Given


Diltiazem HCl (Cardizem)  60 mg PO Q6HR [facility]


   Stop: 08/28/18 17:59


   Last Admin: 06/30/18 12:25 Dose:  Not Given


Docusate Sodium (Colace)  100 mg PO BID [facility]


   Stop: 08/27/18 08:59


   Last Admin: 06/30/18 16:36 Dose:  Not Given


Donepezil HCl (Aricept)  10 mg PO HS [facility]


   Stop: 08/26/18 21:59


   Last Admin: 06/29/18 20:12 Dose:  Not Given


Enoxaparin Sodium (Lovenox)  30 mg SUBQ DAILY [facility]


   Stop: 08/29/18 11:44


   Last Admin: 06/30/18 12:20 Dose:  30 mg


Gabapentin (Neurontin)  600 mg PO Q6HR [facility]


   Stop: 08/27/18 05:59


   Last Admin: 06/30/18 12:25 Dose:  Not Given


Hydromorphone HCl (Dilaudid)  1 mg IVP Q6H PRN


   PRN Reason: Pain (Moderate)


   Stop: 08/28/18 16:38


   Last Admin: 06/29/18 17:05 Dose:  1 mg


Hydromorphone HCl (Dilaudid)  2 mg IVP Q6H PRN


   PRN Reason: SEVERE PAIN


   Stop: 08/28/18 21:10


   Last Admin: 06/30/18 12:15 Dose:  2 mg


Levofloxacin (Levaquin Pb)  250 mg in 50 mls @ 50 mls/hr IV Q24HR [facility]


   Stop: 08/27/18 00:59


   Last Infusion: 06/30/18 00:21 Dose:  Infused


Metronidazole (Flagyl)  500 mg in 100 mls @ 100 mls/hr IV Q12HR [facility]


   Stop: 08/29/18 20:59


Dextrose/Sodium Chloride (D5-0.9%Ns)  1,000 mls @ 125 mls/hr IV .Q8H [facility]


   Stop: 08/29/18 16:14


   Last Admin: 06/30/18 16:35 Dose:  125 mls/hr


Amikacin Sulfate 375 mg/ (Dextrose)  101.5 mls @ 100 mls/hr IV Q48HR [facility]


   Stop: 08/30/18 08:59


Levothyroxine Sodium (Synthroid)  0.025 mg PO DAILY@0730 [facility]


   Stop: 08/27/18 07:29


   Last Admin: 06/30/18 06:39 Dose:  Not Given


Lorazepam (Ativan)  0.5 mg IVP Q6HR PRN; Protocol


   PRN Reason: Agitation


   Stop: 08/29/18 11:03


   Last Admin: 06/30/18 15:25 Dose:  0.5 mg


Miscellaneous (Lovenox Subq Per Pharmacy)  1 [facility] PRN PRN


   PRN Reason: PROTOCOL


   Stop: 08/29/18 11:40


Miscellaneous (Amikacin Iv Per Pharmacy)  1 [facility] PRN PRN


   PRN Reason: Protocol


   Stop: 08/29/18 15:52


Montelukast Sodium (Singulair)  10 mg PO DAILY [facility]


   Stop: 08/27/18 08:59


   Last Admin: 06/30/18 09:04 Dose:  Not Given


Morphine Sulfate (Morphine)  1 mg IVP Q4HR PRN


   PRN Reason: Moderate Pain


   Stop: 08/26/18 21:35


   Last Admin: 06/29/18 14:02 Dose:  1 mg


Mupirocin (Bactroban Oint)  1 appl NS BID [facility]


   Stop: 07/04/18 09:01


   Last Admin: 06/30/18 16:36 Dose:  1 appl


Ondansetron HCl (Zofran)  4 mg IV Q6H PRN


   PRN Reason: Nausea / Vomiting


   Stop: 08/26/18 21:34


   Last Admin: 06/29/18 16:26 Dose:  4 mg


Pantoprazole Sodium (Protonix)  40 mg PO QDAC [facility]


   Stop: 08/27/18 07:29


   Last Admin: 06/30/18 06:39 Dose:  Not Given


Prednisone (Deltasone)  5 mg PO DAILY [facility]


   Stop: 08/27/18 08:59


   Last Admin: 06/30/18 09:04 Dose:  Not Given








General: No acute distress, Mild distress


Neck: Supple


Cardiovascular: Regular rate


Lungs: Clear to auscultation


Abdomen: Bowel sounds, Soft, Tender (DIFFUSE), Distended (MILD)





Assessment/Plan





- Assessment


Assessment: 





1. ABD PAIN WITH CT SHOWED SIGMOID DIVERTICULITIS.


2. HIATAL HERNIA.


3. CHOLELITHIASIS, LESS LIKELY ACUTE CHOLECYSTITIS.


4. CHEST PAIN WITH ELEVATED TROPONIN - LIKELY ACS/NSTEMI.





- Plan


Plan: 





1. ABX.


2. NPO.


3. PAIN CONTROL MEASURES.


4. IVF.


5. PER CARDS.


6. FAILED SWALLOW EVAL.  POLST OUTLINED WISH FOR DNR AND NO ARTIFICIAL FEEDING. English